# Patient Record
Sex: MALE | Race: WHITE | NOT HISPANIC OR LATINO
[De-identification: names, ages, dates, MRNs, and addresses within clinical notes are randomized per-mention and may not be internally consistent; named-entity substitution may affect disease eponyms.]

---

## 2017-02-17 ENCOUNTER — CHARTING TRANS (OUTPATIENT)
Dept: OTHER | Age: 27
End: 2017-02-17

## 2017-02-27 ENCOUNTER — CHARTING TRANS (OUTPATIENT)
Dept: OTHER | Age: 27
End: 2017-02-27

## 2017-03-10 ENCOUNTER — LAB SERVICES (OUTPATIENT)
Dept: OTHER | Age: 27
End: 2017-03-10

## 2017-03-12 ENCOUNTER — CHARTING TRANS (OUTPATIENT)
Dept: OTHER | Age: 27
End: 2017-03-12

## 2017-03-12 LAB
ALBUMIN SERPL-MCNC: 4 G/DL (ref 3.6–5.1)
ALBUMIN/GLOB SERPL: 1.2 (ref 1–2.4)
ALP SERPL-CCNC: 51 UNITS/L (ref 45–117)
ALT SERPL-CCNC: 22 UNITS/L
ANION GAP SERPL CALC-SCNC: 15 MMOL/L (ref 10–20)
AST SERPL-CCNC: 9 UNITS/L
BASOPHILS # BLD: 0 K/MCL (ref 0–0.3)
BASOPHILS NFR BLD: 1 %
BILIRUB SERPL-MCNC: 0.9 MG/DL (ref 0.2–1)
BUN SERPL-MCNC: 13 MG/DL (ref 6–20)
BUN/CREAT SERPL: 20 (ref 7–25)
C PEPTIDE SERPL-MCNC: <0.1 NG/ML (ref 0.8–3.9)
CALCIUM SERPL-MCNC: 9.7 MG/DL (ref 8.4–10.2)
CHLORIDE SERPL-SCNC: 104 MMOL/L (ref 98–107)
CHOLEST SERPL-MCNC: 229 MG/DL
CHOLEST/HDLC SERPL: 5.1
CO2 SERPL-SCNC: 26 MMOL/L (ref 21–32)
CREAT SERPL-MCNC: 0.66 MG/DL (ref 0.67–1.17)
CREATININE RANDOM URINE: 123 MG/DL
DIFFERENTIAL METHOD BLD: ABNORMAL
EOSINOPHIL # BLD: 0.1 K/MCL (ref 0.1–0.5)
EOSINOPHIL NFR BLD: 2 %
ERYTHROCYTE [DISTWIDTH] IN BLOOD: 12.1 % (ref 11–15)
GLOBULIN SER-MCNC: 3.2 G/DL (ref 2–4)
GLUCOSE SERPL-MCNC: 275 MG/DL (ref 65–99)
HBA1C MFR BLD: 8.5 % (ref 4.5–5.6)
HDLC SERPL-MCNC: 45 MG/DL
HEMATOCRIT: 47.4 % (ref 39–51)
HEMOGLOBIN: 16.3 G/DL (ref 13–17)
LDLC SERPL CALC-MCNC: 159 MG/DL
LENGTH OF FAST TIME PATIENT: ABNORMAL HRS
LENGTH OF FAST TIME PATIENT: ABNORMAL HRS
LYMPHOCYTES # BLD: 1.7 K/MCL (ref 1–4.8)
LYMPHOCYTES NFR BLD: 31 %
MEAN CORPUSCULAR HEMOGLOBIN: 32.7 PG (ref 26–34)
MEAN CORPUSCULAR HGB CONC: 34.4 G/DL (ref 32–36.5)
MEAN CORPUSCULAR VOLUME: 95 FL (ref 78–100)
MICROALBUMIN UR-MCNC: 0.82 MG/DL
MICROALBUMIN/CREAT UR: 6.7 MCG/MG
MONOCYTES # BLD: 0.6 K/MCL (ref 0.3–0.9)
MONOCYTES NFR BLD: 10 %
NEUTROPHILS # BLD: 3.1 K/MCL (ref 1.8–7.7)
NEUTROPHILS NFR BLD: 56 %
NONHDLC SERPL-MCNC: 184 MG/DL
PLATELET COUNT: 309 K/MCL (ref 140–450)
POTASSIUM SERPL-SCNC: 4.8 MMOL/L (ref 3.4–5.1)
RED CELL COUNT: 4.99 MIL/MCL (ref 4.5–5.9)
SODIUM SERPL-SCNC: 140 MMOL/L (ref 135–145)
TOTAL PROTEIN: 7.2 G/DL (ref 6.4–8.2)
TRIGL SERPL-MCNC: 124 MG/DL
TSH SERPL-ACNC: 0.95 MCUNITS/ML (ref 0.35–5)
WHITE BLOOD COUNT: 5.5 K/MCL (ref 4.2–11)

## 2017-03-26 ENCOUNTER — CHARTING TRANS (OUTPATIENT)
Dept: OTHER | Age: 27
End: 2017-03-26

## 2017-03-26 LAB — GAD65 AB SER-ACNC: 18.8 IU/ML

## 2018-11-05 VITALS
RESPIRATION RATE: 16 BRPM | HEART RATE: 84 BPM | HEIGHT: 67 IN | OXYGEN SATURATION: 98 % | DIASTOLIC BLOOD PRESSURE: 80 MMHG | SYSTOLIC BLOOD PRESSURE: 132 MMHG | WEIGHT: 149 LBS | TEMPERATURE: 97.5 F | BODY MASS INDEX: 23.39 KG/M2

## 2018-11-05 VITALS
RESPIRATION RATE: 15 BRPM | TEMPERATURE: 98.2 F | SYSTOLIC BLOOD PRESSURE: 120 MMHG | WEIGHT: 154 LBS | HEART RATE: 91 BPM | BODY MASS INDEX: 24.17 KG/M2 | HEIGHT: 67 IN | OXYGEN SATURATION: 97 % | DIASTOLIC BLOOD PRESSURE: 80 MMHG

## 2019-03-18 ENCOUNTER — WALK IN (OUTPATIENT)
Dept: URGENT CARE | Age: 29
End: 2019-03-18

## 2019-03-18 ENCOUNTER — TELEPHONE (OUTPATIENT)
Dept: SCHEDULING | Age: 29
End: 2019-03-18

## 2019-03-18 VITALS
TEMPERATURE: 98.2 F | WEIGHT: 165 LBS | HEIGHT: 67 IN | DIASTOLIC BLOOD PRESSURE: 60 MMHG | BODY MASS INDEX: 25.9 KG/M2 | RESPIRATION RATE: 18 BRPM | OXYGEN SATURATION: 100 % | SYSTOLIC BLOOD PRESSURE: 120 MMHG | HEART RATE: 98 BPM

## 2019-03-18 DIAGNOSIS — H65.192 OTHER ACUTE NONSUPPURATIVE OTITIS MEDIA OF LEFT EAR, RECURRENCE NOT SPECIFIED: ICD-10-CM

## 2019-03-18 DIAGNOSIS — H61.21 IMPACTED CERUMEN OF RIGHT EAR: Primary | ICD-10-CM

## 2019-03-18 PROCEDURE — 69209 REMOVE IMPACTED EAR WAX UNI: CPT | Performed by: NURSE PRACTITIONER

## 2019-03-18 PROCEDURE — 99203 OFFICE O/P NEW LOW 30 MIN: CPT | Performed by: NURSE PRACTITIONER

## 2019-03-18 RX ORDER — INSULIN ASPART 100 [IU]/ML
INJECTION, SOLUTION INTRAVENOUS; SUBCUTANEOUS
COMMUNITY

## 2019-03-18 RX ORDER — AMOXICILLIN 875 MG/1
875 TABLET, COATED ORAL 2 TIMES DAILY
Qty: 20 TABLET | Refills: 0 | Status: SHIPPED | OUTPATIENT
Start: 2019-03-18

## 2019-03-18 RX ORDER — FLUTICASONE PROPIONATE 50 MCG
2 SPRAY, SUSPENSION (ML) NASAL DAILY
Qty: 16 G | Refills: 12 | Status: SHIPPED | OUTPATIENT
Start: 2019-03-18

## 2019-03-18 ASSESSMENT — ENCOUNTER SYMPTOMS
FATIGUE: 0
CHILLS: 0
FEVER: 0
COUGH: 0
UNEXPECTED WEIGHT CHANGE: 0
HEADACHES: 0
ADENOPATHY: 1
DIAPHORESIS: 0
APPETITE CHANGE: 0
ACTIVITY CHANGE: 0

## 2022-07-05 ENCOUNTER — WALK IN (OUTPATIENT)
Dept: URGENT CARE | Age: 32
End: 2022-07-05

## 2022-07-05 VITALS
BODY MASS INDEX: 24.72 KG/M2 | OXYGEN SATURATION: 97 % | TEMPERATURE: 99.3 F | SYSTOLIC BLOOD PRESSURE: 120 MMHG | HEIGHT: 67 IN | WEIGHT: 157.52 LBS | HEART RATE: 109 BPM | DIASTOLIC BLOOD PRESSURE: 86 MMHG | RESPIRATION RATE: 16 BRPM

## 2022-07-05 DIAGNOSIS — L23.7 POISON IVY: Primary | ICD-10-CM

## 2022-07-05 PROCEDURE — 99203 OFFICE O/P NEW LOW 30 MIN: CPT | Performed by: NURSE PRACTITIONER

## 2022-07-05 RX ORDER — CLOBETASOL PROPIONATE 0.5 MG/G
CREAM TOPICAL 2 TIMES DAILY
Qty: 30 G | Refills: 0 | Status: SHIPPED | OUTPATIENT
Start: 2022-07-05

## 2022-07-05 RX ORDER — PREDNISONE 20 MG/1
TABLET ORAL
Qty: 25 TABLET | Refills: 0 | Status: SHIPPED | OUTPATIENT
Start: 2022-07-05

## 2022-07-05 ASSESSMENT — ENCOUNTER SYMPTOMS
ABDOMINAL PAIN: 0
DIARRHEA: 0
ENDOCRINE NEGATIVE: 1
SINUS PAIN: 0
DIZZINESS: 0
NAUSEA: 0
CHILLS: 0
ALLERGIC/IMMUNOLOGIC NEGATIVE: 1
CONSTIPATION: 0
SHORTNESS OF BREATH: 0
SINUS PRESSURE: 0
RHINORRHEA: 0
CHEST TIGHTNESS: 0
COUGH: 0
LIGHT-HEADEDNESS: 0
FEVER: 0
GASTROINTESTINAL NEGATIVE: 1
APPETITE CHANGE: 0
SORE THROAT: 0
RESPIRATORY NEGATIVE: 1
ANOREXIA: 0
EYE PAIN: 0
VOMITING: 0
HEADACHES: 0
WHEEZING: 0
EYES NEGATIVE: 1
WEAKNESS: 0
CONSTITUTIONAL NEGATIVE: 1
PSYCHIATRIC NEGATIVE: 1
NAIL CHANGES: 0
HEMATOLOGIC/LYMPHATIC NEGATIVE: 1
FATIGUE: 0
SEIZURES: 0

## 2023-08-07 ENCOUNTER — HOSPITAL ENCOUNTER (OUTPATIENT)
Age: 33
Discharge: HOME | End: 2023-08-07
Payer: COMMERCIAL

## 2023-08-07 ENCOUNTER — APPOINTMENT (OUTPATIENT)
Dept: GENERAL RADIOLOGY | Age: 33
End: 2023-08-07
Attending: PHYSICIAN ASSISTANT
Payer: COMMERCIAL

## 2023-08-07 VITALS
OXYGEN SATURATION: 98 % | HEART RATE: 82 BPM | RESPIRATION RATE: 18 BRPM | SYSTOLIC BLOOD PRESSURE: 122 MMHG | TEMPERATURE: 98 F | DIASTOLIC BLOOD PRESSURE: 78 MMHG

## 2023-08-07 DIAGNOSIS — J40 BRONCHITIS: Primary | ICD-10-CM

## 2023-08-07 PROCEDURE — 99203 OFFICE O/P NEW LOW 30 MIN: CPT

## 2023-08-07 PROCEDURE — 71046 X-RAY EXAM CHEST 2 VIEWS: CPT | Performed by: PHYSICIAN ASSISTANT

## 2023-08-07 RX ORDER — AZITHROMYCIN 250 MG/1
TABLET, FILM COATED ORAL
Qty: 6 TABLET | Refills: 0 | Status: SHIPPED | OUTPATIENT
Start: 2023-08-07 | End: 2023-08-12

## 2023-08-07 RX ORDER — ALBUTEROL SULFATE 90 UG/1
2 AEROSOL, METERED RESPIRATORY (INHALATION) EVERY 4 HOURS PRN
Qty: 1 EACH | Refills: 0 | Status: SHIPPED | OUTPATIENT
Start: 2023-08-07 | End: 2023-09-06

## 2023-08-07 RX ORDER — INSULIN ASPART 100 [IU]/ML
INJECTION, SOLUTION INTRAVENOUS; SUBCUTANEOUS
COMMUNITY
Start: 2022-10-14

## 2023-08-07 RX ORDER — INSULIN LISPRO 100 [IU]/ML
INJECTION, SOLUTION INTRAVENOUS; SUBCUTANEOUS
COMMUNITY
Start: 2023-07-13

## 2023-08-07 RX ORDER — GUAIFENESIN AND DEXTROMETHORPHAN HYDROBROMIDE 1200; 60 MG/1; MG/1
1 TABLET, EXTENDED RELEASE ORAL EVERY 12 HOURS
Qty: 14 TABLET | Refills: 0 | Status: SHIPPED | OUTPATIENT
Start: 2023-08-07 | End: 2023-08-07 | Stop reason: ALTCHOICE

## 2023-08-07 NOTE — ED INITIAL ASSESSMENT (HPI)
Pt c/o cough x 1 month, denies SOB, no fever, states fever mainly dry but more congested and worsening in the last 3 days.

## 2023-10-07 ENCOUNTER — APPOINTMENT (OUTPATIENT)
Dept: GENERAL RADIOLOGY | Age: 33
End: 2023-10-07
Attending: EMERGENCY MEDICINE

## 2023-10-07 ENCOUNTER — HOSPITAL ENCOUNTER (OUTPATIENT)
Age: 33
Discharge: HOME | End: 2023-10-07
Attending: EMERGENCY MEDICINE

## 2023-10-07 VITALS
HEART RATE: 92 BPM | OXYGEN SATURATION: 98 % | RESPIRATION RATE: 20 BRPM | TEMPERATURE: 98 F | DIASTOLIC BLOOD PRESSURE: 85 MMHG | SYSTOLIC BLOOD PRESSURE: 118 MMHG

## 2023-10-07 DIAGNOSIS — S22.060A CLOSED WEDGE COMPRESSION FRACTURE OF T8 VERTEBRA, INITIAL ENCOUNTER (HCC): Primary | ICD-10-CM

## 2023-10-07 PROCEDURE — 72072 X-RAY EXAM THORAC SPINE 3VWS: CPT | Performed by: EMERGENCY MEDICINE

## 2023-10-07 PROCEDURE — 99214 OFFICE O/P EST MOD 30 MIN: CPT

## 2023-10-07 PROCEDURE — 99213 OFFICE O/P EST LOW 20 MIN: CPT

## 2023-10-07 RX ORDER — TRAMADOL HYDROCHLORIDE 50 MG/1
TABLET ORAL EVERY 6 HOURS PRN
Qty: 20 TABLET | Refills: 0 | Status: SHIPPED | OUTPATIENT
Start: 2023-10-07 | End: 2023-10-16

## 2023-10-07 NOTE — DISCHARGE INSTRUCTIONS
Over the counter ibuprofen 600mg every six hours, lidocaine patch as directed  Tramadol as prescribed. Follow up with dr Bourne Sessions for further treatment.

## 2023-10-16 ENCOUNTER — OFFICE VISIT (OUTPATIENT)
Dept: INTERNAL MEDICINE CLINIC | Facility: CLINIC | Age: 33
End: 2023-10-16

## 2023-10-16 ENCOUNTER — TELEPHONE (OUTPATIENT)
Dept: INTERNAL MEDICINE CLINIC | Facility: CLINIC | Age: 33
End: 2023-10-16

## 2023-10-16 VITALS
DIASTOLIC BLOOD PRESSURE: 87 MMHG | HEIGHT: 67 IN | BODY MASS INDEX: 23.07 KG/M2 | WEIGHT: 147 LBS | HEART RATE: 103 BPM | SYSTOLIC BLOOD PRESSURE: 129 MMHG

## 2023-10-16 DIAGNOSIS — F41.1 GAD (GENERALIZED ANXIETY DISORDER): ICD-10-CM

## 2023-10-16 DIAGNOSIS — F90.9 ATTENTION DEFICIT HYPERACTIVITY DISORDER (ADHD), UNSPECIFIED ADHD TYPE: ICD-10-CM

## 2023-10-16 DIAGNOSIS — Z00.00 WELLNESS EXAMINATION: Primary | ICD-10-CM

## 2023-10-16 DIAGNOSIS — E10.9 TYPE 1 DIABETES MELLITUS WITHOUT COMPLICATION (HCC): ICD-10-CM

## 2023-10-16 PROCEDURE — 3008F BODY MASS INDEX DOCD: CPT | Performed by: NURSE PRACTITIONER

## 2023-10-16 PROCEDURE — 3079F DIAST BP 80-89 MM HG: CPT | Performed by: NURSE PRACTITIONER

## 2023-10-16 PROCEDURE — 99203 OFFICE O/P NEW LOW 30 MIN: CPT | Performed by: NURSE PRACTITIONER

## 2023-10-16 PROCEDURE — 99385 PREV VISIT NEW AGE 18-39: CPT | Performed by: NURSE PRACTITIONER

## 2023-10-16 PROCEDURE — 3074F SYST BP LT 130 MM HG: CPT | Performed by: NURSE PRACTITIONER

## 2023-10-16 RX ORDER — LISDEXAMFETAMINE DIMESYLATE CAPSULES 30 MG/1
CAPSULE ORAL
COMMUNITY
Start: 2023-09-25

## 2023-10-16 RX ORDER — INSULIN LISPRO 100 [IU]/ML
INJECTION, SOLUTION INTRAVENOUS; SUBCUTANEOUS
Qty: 10 ML | Refills: 2 | Status: SHIPPED | OUTPATIENT
Start: 2023-10-16

## 2023-10-16 NOTE — TELEPHONE ENCOUNTER
[facility] is requesting clarification on medication Insulin. .    Needs directions posted on prescription    Fax # [phone]

## 2023-10-16 NOTE — TELEPHONE ENCOUNTER
Patient contacted. He has a [employer]ron[employer] Mind 670G insulin pump and automatically adjusts based on CGM device. He states generally he uses 200ml into his pump every 3 days     Attempted to call [facility] with updated information. Pharmacy not available. Call tomorrow.    (He just established care with our office.)

## 2023-10-16 NOTE — TELEPHONE ENCOUNTER
Spoke to the Earnstine Breeze who asked if Insulin pump dose is correct at 35.85 units. She's asking for clarification on how much the basal rate should be hourly. She is figuring 35.85 over 24 hours plus 15 units bolus is 45 so should the daily dose be 35.85 plus 45 so total of 80.85? Can you please clarify the directions as the pharmacy themselves are a bit confused.  thanks

## 2023-10-17 NOTE — TELEPHONE ENCOUNTER
Called [facility] pharmacist Jacinta Mabry indicated that if patient is using 200ml every 3 days then that is 20 vials. Pharmacist indicated that it might be 200units not ml. Conferenced call with patient and pharmacist. Patient confirmed he is using 200 units every 3 days, so 2 vials. Pharmacist will dispense quantity sufficient with 2 refills with a 81 unit max per day. Rx updated in notes.

## 2023-11-20 ENCOUNTER — LAB ENCOUNTER (OUTPATIENT)
Dept: LAB | Age: 33
End: 2023-11-20
Attending: NURSE PRACTITIONER
Payer: COMMERCIAL

## 2023-11-20 DIAGNOSIS — F41.1 GAD (GENERALIZED ANXIETY DISORDER): ICD-10-CM

## 2023-11-20 DIAGNOSIS — E10.9 TYPE 1 DIABETES MELLITUS WITHOUT COMPLICATION (HCC): ICD-10-CM

## 2023-11-20 LAB
ALBUMIN SERPL-MCNC: 4.9 G/DL (ref 3.2–4.8)
ALBUMIN/GLOB SERPL: 1.6 {RATIO} (ref 1–2)
ALP LIVER SERPL-CCNC: 62 U/L
ALT SERPL-CCNC: 16 U/L
ANION GAP SERPL CALC-SCNC: 6 MMOL/L (ref 0–18)
AST SERPL-CCNC: 17 U/L (ref ?–34)
BASOPHILS # BLD AUTO: 0.13 X10(3) UL (ref 0–0.2)
BASOPHILS NFR BLD AUTO: 1.1 %
BILIRUB SERPL-MCNC: 0.6 MG/DL (ref 0.3–1.2)
BUN BLD-MCNC: 9 MG/DL (ref 9–23)
BUN/CREAT SERPL: 8.9 (ref 10–20)
CALCIUM BLD-MCNC: 10.2 MG/DL (ref 8.7–10.4)
CHLORIDE SERPL-SCNC: 99 MMOL/L (ref 98–112)
CHOLEST SERPL-MCNC: 182 MG/DL (ref ?–200)
CO2 SERPL-SCNC: 27 MMOL/L (ref 21–32)
CREAT BLD-MCNC: 1.01 MG/DL
CREAT UR-SCNC: 50.2 MG/DL
DEPRECATED RDW RBC AUTO: 39.9 FL (ref 35.1–46.3)
EGFRCR SERPLBLD CKD-EPI 2021: 101 ML/MIN/1.73M2 (ref 60–?)
EOSINOPHIL # BLD AUTO: 0.26 X10(3) UL (ref 0–0.7)
EOSINOPHIL NFR BLD AUTO: 2.2 %
ERYTHROCYTE [DISTWIDTH] IN BLOOD BY AUTOMATED COUNT: 12.1 % (ref 11–15)
EST. AVERAGE GLUCOSE BLD GHB EST-MCNC: 229 MG/DL (ref 68–126)
FASTING PATIENT LIPID ANSWER: NO
FASTING STATUS PATIENT QL REPORTED: NO
GLOBULIN PLAS-MCNC: 3 G/DL (ref 2.8–4.4)
GLUCOSE BLD-MCNC: 341 MG/DL (ref 70–99)
HBA1C MFR BLD: 9.6 % (ref ?–5.7)
HCT VFR BLD AUTO: 46 %
HDLC SERPL-MCNC: 38 MG/DL (ref 40–59)
HGB BLD-MCNC: 16.1 G/DL
IMM GRANULOCYTES # BLD AUTO: 0.02 X10(3) UL (ref 0–1)
IMM GRANULOCYTES NFR BLD: 0.2 %
LDLC SERPL CALC-MCNC: 119 MG/DL (ref ?–100)
LYMPHOCYTES # BLD AUTO: 1.82 X10(3) UL (ref 1–4)
LYMPHOCYTES NFR BLD AUTO: 15.1 %
MCH RBC QN AUTO: 31.1 PG (ref 26–34)
MCHC RBC AUTO-ENTMCNC: 35 G/DL (ref 31–37)
MCV RBC AUTO: 89 FL
MICROALBUMIN UR-MCNC: <0.3 MG/DL
MONOCYTES # BLD AUTO: 1.23 X10(3) UL (ref 0.1–1)
MONOCYTES NFR BLD AUTO: 10.2 %
NEUTROPHILS # BLD AUTO: 8.58 X10 (3) UL (ref 1.5–7.7)
NEUTROPHILS # BLD AUTO: 8.58 X10(3) UL (ref 1.5–7.7)
NEUTROPHILS NFR BLD AUTO: 71.2 %
NONHDLC SERPL-MCNC: 144 MG/DL (ref ?–130)
OSMOLALITY SERPL CALC.SUM OF ELEC: 286 MOSM/KG (ref 275–295)
PLATELET # BLD AUTO: 401 10(3)UL (ref 150–450)
POTASSIUM SERPL-SCNC: 4.7 MMOL/L (ref 3.5–5.1)
PROT SERPL-MCNC: 7.9 G/DL (ref 5.7–8.2)
RBC # BLD AUTO: 5.17 X10(6)UL
SODIUM SERPL-SCNC: 132 MMOL/L (ref 136–145)
TRIGL SERPL-MCNC: 137 MG/DL (ref 30–149)
TSI SER-ACNC: 1.23 MIU/ML (ref 0.55–4.78)
VLDLC SERPL CALC-MCNC: 24 MG/DL (ref 0–30)
WBC # BLD AUTO: 12 X10(3) UL (ref 4–11)

## 2023-11-20 PROCEDURE — 84443 ASSAY THYROID STIM HORMONE: CPT

## 2023-11-20 PROCEDURE — 85025 COMPLETE CBC W/AUTO DIFF WBC: CPT

## 2023-11-20 PROCEDURE — 82570 ASSAY OF URINE CREATININE: CPT

## 2023-11-20 PROCEDURE — 36415 COLL VENOUS BLD VENIPUNCTURE: CPT

## 2023-11-20 PROCEDURE — 83036 HEMOGLOBIN GLYCOSYLATED A1C: CPT

## 2023-11-20 PROCEDURE — 80053 COMPREHEN METABOLIC PANEL: CPT

## 2023-11-20 PROCEDURE — 80061 LIPID PANEL: CPT

## 2023-11-20 PROCEDURE — 82043 UR ALBUMIN QUANTITATIVE: CPT

## 2023-11-25 ENCOUNTER — PATIENT MESSAGE (OUTPATIENT)
Dept: INTERNAL MEDICINE CLINIC | Facility: CLINIC | Age: 33
End: 2023-11-25

## 2023-11-27 NOTE — TELEPHONE ENCOUNTER
Pt can contact his insurance for in network provider. If he is unable to get a sooner appt with Sharon Randhawa, he can try Dr Naun Smith or Dr Kayla Chanel.

## 2023-12-01 ENCOUNTER — OFFICE VISIT (OUTPATIENT)
Dept: ENDOCRINOLOGY CLINIC | Facility: CLINIC | Age: 33
End: 2023-12-01
Payer: COMMERCIAL

## 2023-12-01 VITALS
SYSTOLIC BLOOD PRESSURE: 128 MMHG | DIASTOLIC BLOOD PRESSURE: 88 MMHG | HEART RATE: 121 BPM | WEIGHT: 137 LBS | BODY MASS INDEX: 21 KG/M2

## 2023-12-01 DIAGNOSIS — R79.89 LOW VITAMIN D LEVEL: ICD-10-CM

## 2023-12-01 DIAGNOSIS — L65.9 HAIR LOSS: Primary | ICD-10-CM

## 2023-12-01 DIAGNOSIS — E10.649 UNCONTROLLED TYPE 1 DIABETES MELLITUS WITH HYPOGLYCEMIA WITHOUT COMA (HCC): ICD-10-CM

## 2023-12-01 DIAGNOSIS — E10.65 UNCONTROLLED TYPE 1 DIABETES MELLITUS WITH HYPERGLYCEMIA (HCC): ICD-10-CM

## 2023-12-01 LAB
GLUCOSE BLOOD: 86
TEST STRIP EXPIRATION DATE: NORMAL DATE
TEST STRIP LOT #: NORMAL NUMERIC

## 2023-12-01 PROCEDURE — 82947 ASSAY GLUCOSE BLOOD QUANT: CPT | Performed by: INTERNAL MEDICINE

## 2023-12-01 PROCEDURE — 3079F DIAST BP 80-89 MM HG: CPT | Performed by: INTERNAL MEDICINE

## 2023-12-01 PROCEDURE — 99205 OFFICE O/P NEW HI 60 MIN: CPT | Performed by: INTERNAL MEDICINE

## 2023-12-01 PROCEDURE — 3074F SYST BP LT 130 MM HG: CPT | Performed by: INTERNAL MEDICINE

## 2023-12-01 RX ORDER — INSULIN GLARGINE 100 [IU]/ML
40 INJECTION, SOLUTION SUBCUTANEOUS NIGHTLY
Qty: 15 ML | Refills: 0 | Status: SHIPPED | OUTPATIENT
Start: 2023-12-01 | End: 2024-02-29

## 2023-12-01 RX ORDER — BLOOD SUGAR DIAGNOSTIC
1 STRIP MISCELLANEOUS 3 TIMES DAILY
Qty: 100 STRIP | Refills: 11 | Status: SHIPPED | OUTPATIENT
Start: 2023-12-01 | End: 2024-11-25

## 2023-12-01 RX ORDER — PROCHLORPERAZINE 25 MG/1
1 SUPPOSITORY RECTAL
Qty: 1 EACH | Refills: 1 | Status: SHIPPED | OUTPATIENT
Start: 2023-12-01 | End: 2024-05-29

## 2023-12-01 RX ORDER — INSULIN ASPART INJECTION 100 [IU]/ML
100 INJECTION, SOLUTION SUBCUTANEOUS DAILY
Qty: 20 ML | Refills: 11 | Status: SHIPPED | OUTPATIENT
Start: 2023-12-01 | End: 2024-11-25

## 2023-12-01 RX ORDER — PROCHLORPERAZINE 25 MG/1
1 SUPPOSITORY RECTAL
Qty: 3 EACH | Refills: 11 | Status: SHIPPED | OUTPATIENT
Start: 2023-12-01

## 2023-12-01 NOTE — PROGRESS NOTES
New Patient Evaluation - History and Physical    CONSULT - Reason for Visit:  t1DM. Requesting Physician: Lowell Allison MD    CHIEF COMPLAINT:  t1DM on pump     HISTORY OF PRESENT ILLNESS:   Abiodun Ramos is a 34year old male who presents with t1DM    Was seeing Endo at  Dr Robel Mosher    t1DM dx when 12   DKA x2. Last in   Has been on pump since . Omnipod, medtronic and now on pump 770g ~ 2018    He wants to switch to 780G but open to try new ones. He contacted medtronic already  Needs a new endocrinologist  Does not like the alarms from [address]. Does not go on pump holiday   Does not have lantus  , glucacon   Has ketone strips   Rarely overrides- Increases typical by 1.5 unit  Correction 2 units for every 50 over 150     Bolus   ICR 6.3           insulin carb ratio   ISF 32             insulin sensitivity factor   AIT 4.3             active insulin time   Target     Basal 36.975 unit/day  MN 1.3  3AM  1.4  7AM 1.7  330PM 1.55        A1c 9.6, tsh, lipid, CMp  Neg alb/creat      SSx   Works for [employer] for work      3 yo daughter   Sandrita Mcarthur   No etoh now - 8 mo stopped   No marijuana   No drugs       T8 Vertebrae fracture - saw spine specialist for that. 2024   Takes vit D and Ca daily now         DM quality measures:  A1C/Blood pressure: as reported above   Nephropathy screenin2023 neg LIPID screenin2023  Last dilated eye exam: 2022  data recorded  Last diabetic foot exam: 2023  Dentist : recommend every 6m      ASSESSMENT AND PLAN:      Uncontrolled t1DM on pump  Hypo and hyperglycemia   Not using CGM d/t errors and alerts from [employer] so will switch to [id number]   Due for a new pump   Discussed w/ pt in length and he is motivated.  He will research which pump and let us know   Made pump changes today   UTD on foot exam and needs eye exam now     plan  Labs and follow up in 3 mo   Will send [address] G6 orders  - will change to [facility] once it is integrated with the pumps    Call us when you decide which pump (tandem vs omnipod) to send new orders    Dec will get eye exam   12/1/2023 Foot exam     New pump settings   Bolus   ICR 6.3             insulin carb ratio   ISF 32  ->29       insulin sensitivity factor   AIT 4.3  ->4         active insulin time   Target     Basal 36.975 unit/day  MN 1.3  3AM  1.4  7AM 1.7  330PM 1.55      PAST MEDICAL HISTORY:   T1dm  T8 Vertebrae fracture - saw spine specialist for that. 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 15 mL 0    Urine Glucose-Ketones Test In Vitro Strip Use if BG > 250 50 strip 0    Insulin Aspart, w/Niacinamide, (FIASP) 100 UNIT/ML Injection Solution 100 Units by Other route daily. 20 mL 11    Glucose Blood (BLOOD GLUCOSE TEST STRIPS 333) In Vitro Strip 1 each by In Vitro route 3 (three) times daily. 100 strip 11    Continuous Blood Gluc Sensor (DEXCOM G6 SENSOR) Does not apply Misc 1 each Every 10 days. Use as directed every 10 days 3 each 11    lisdexamfetamine 30 MG Oral Cap       HUMALOG 100 UNIT/ML Injection Solution Insulin pump: 35.85 units basal insulin daily. 10-15 units bolus with each meal. 10 mL 2    insulin aspart 100 Units/mL Injection Solution INJECT UP TO 70 UNITS UNDER THE SKIN DAILY VIA MEDTRONIC 670G INSULIN PUMP.  OK to change to Humalog if Novolog not covered      sertraline 50 MG Oral Tab          ALLERGIES:  No Known Allergies    SOCIAL HISTORY:    Social History     Socioeconomic History    Marital status:    Tobacco Use    Smoking status: Never    Smokeless tobacco: Never       FAMILY HISTORY:     T1dm in PGF  Cousin with t1DM had HD and passed a way from complications   F prostate ca in remission now  P uncle cancer ? MGF lung ca  M osteoporosis, recovery from etoh, adult ADD  Brother is in recovery from etoh    REVIEW OF SYSTEMS:  All negative other than HPI      PHYSICAL EXAM:   Height: --  Weight: 137 lb (62.1 kg) (12/01 0728)  BSA (Calculated - sq m): --  Pulse: 121 (12/01 0728)  BP: 128/88 (12/01 0728)  Temp: --  Do Not Use - Resp Rate: --  SpO2: --      No goiter  No tremors  No lipohypertrophy   Pump site is on Rt back   Foot exam is normal - no ulcers, good pulse, good hair, normal monofilament test    CONSTITUTIONAL:  Awake and alert. Age appropriate, good hygiene not in acute distress. Well nourished and well developed. no acute distress   PSYCH:   Orientated to time, place, person & situation, Normal mood and affect, memory intact, normal insight and judgment, cooperative  Neuro: speech is clear. Awake, alert, no aphasia, no facial asymmetry, no nuchal rigidity  EYES:  No proptosis, no ptosis, conjunctiva normal  ENT:  Normocephalic, atraumatic  Eye: EOMI, normal lids, no discharge, no conjunctival erythema. No exophthalmos/proptosis, Ptosis negative   No rhinorrhea, moist oral mucosa  Neck: full range of motion  Neck/Thyroid: neck inspection: normal, No scar, No goiter   LUNGS:  No acute respiratory distress, non-labored respiration. Speaking full sentences  CARDIOVASCULAR:  regular rate   ABDOMEN:  No abdominal pain. Nontender soft   SKIN:  no bruising or bleeding, no rashes and no lesions, Skin is dry, no obvious rashes or lesions  EXTREMITIES: no gross abnormality   MSK: Moves extremities spontaneously.  full range of motion in all major joints      DATA:     Pertinent data reviewed    Orders Placed This Encounter   Procedures    POC HemoCue Glucose 201 (Finger stick glucose)    Hemoglobin A1C [E]       12/1/2023  Chery Chairez MD

## 2023-12-01 NOTE — PATIENT INSTRUCTIONS
Labs and follow up in 3 mo   Will send Aryan Rm orders  - will change to [address] [facility] once it is integrated with the pumps    Call us when you decide which pump (tandem vs omnipod) to send new orders    Dec will get eye exam   12/1/2023 Foot exam     New pump settings   Bolus   ICR 6.3             insulin carb ratio   ISF 32  ->29       insulin sensitivity factor   AIT 4.3  ->4         active insulin time   Target     Basal 36.975 unit/day  MN 1.3  3AM  1.4  7AM 1.7  330PM 1.55

## 2023-12-02 ENCOUNTER — PATIENT MESSAGE (OUTPATIENT)
Dept: ENDOCRINOLOGY CLINIC | Facility: CLINIC | Age: 33
End: 2023-12-02

## 2023-12-04 ENCOUNTER — TELEPHONE (OUTPATIENT)
Dept: ENDOCRINOLOGY CLINIC | Facility: CLINIC | Age: 33
End: 2023-12-04

## 2023-12-04 NOTE — TELEPHONE ENCOUNTER
Dr. Eric Thomas     Please advise on message.  We typically give patients our Tandem rep information Stevenson Scale for patient to follow up

## 2023-12-06 ENCOUNTER — MED REC SCAN ONLY (OUTPATIENT)
Dept: ENDOCRINOLOGY CLINIC | Facility: CLINIC | Age: 33
End: 2023-12-06

## 2023-12-14 ENCOUNTER — TELEPHONE (OUTPATIENT)
Dept: ENDOCRINOLOGY CLINIC | Facility: CLINIC | Age: 33
End: 2023-12-14

## 2023-12-14 NOTE — TELEPHONE ENCOUNTER
Clint Arechiga calling to inform RN that Dexcom was denied. For additional questions please call with ref# [id number]. Thank you.

## 2023-12-26 NOTE — TELEPHONE ENCOUNTER
Pharmacy requesting refill of     insulin glargine (LANTUS SOLOSTAR) 100 UNIT/ML Subcutaneous Solution Pen-injector, Inject 40 Units into the skin nightly., Disp: 15 mL, Rfl: 0

## 2023-12-27 RX ORDER — INSULIN GLARGINE 100 [IU]/ML
40 INJECTION, SOLUTION SUBCUTANEOUS NIGHTLY
Qty: 15 ML | Refills: 0 | Status: SHIPPED | OUTPATIENT
Start: 2023-12-27 | End: 2024-03-26

## 2023-12-29 ENCOUNTER — TELEPHONE (OUTPATIENT)
Dept: ENDOCRINOLOGY CLINIC | Facility: CLINIC | Age: 33
End: 2023-12-29

## 2023-12-29 NOTE — TELEPHONE ENCOUNTER
Pt states that he received notification that the PA for Dexcom was denied.  He needs to know if this is being appealed.  He has a short time to return pump if he can not get the Dexcom.  Please call

## 2024-01-02 RX ORDER — INSULIN GLARGINE 100 [IU]/ML
40 INJECTION, SOLUTION SUBCUTANEOUS NIGHTLY
Qty: 36 ML | Refills: 0 | Status: SHIPPED | OUTPATIENT
Start: 2024-01-02 | End: 2024-04-01

## 2024-01-02 NOTE — TELEPHONE ENCOUNTER
LOV: 12/1/23    RTC: 3 Months    FU: No FU Appt Scheduled    Last Refill: 12/27/23 but are requesting a 3 month supply to sent    3 Month Supply Pending Below

## 2024-01-04 NOTE — TELEPHONE ENCOUNTER
Received Dexcom denial letter that states G7 sensor is denied due to patient not meeting criteria:    1) You are motivated and knowledgeable about the use of continuous glucose monitoring, are adherent to the diabetic treatment plan, and participate in ongoing education and support.  (2) You have an inadequate glycemic control despite intensive diabetes management.  (3) You regularly monitor your blood glucose four or more times per day.     Patient meets all this criteria so unsure why this was denied. Will move forward with appeal per Dr. Palmer in encounter . Letter generated and faxed with LOV note and A1C result to [facility] Appeals (expedited) at [phone]. MyChart sent to patient.

## 2024-01-15 NOTE — TELEPHONE ENCOUNTER
Approval letter was received by mail for the following device: Dexcom G7 Sensor. Letter was placed in RN's file cabinet.

## 2024-01-24 ENCOUNTER — HOSPITAL ENCOUNTER (OUTPATIENT)
Age: 34
Discharge: HOME | End: 2024-01-24
Attending: EMERGENCY MEDICINE
Payer: COMMERCIAL

## 2024-01-24 VITALS
DIASTOLIC BLOOD PRESSURE: 81 MMHG | HEART RATE: 120 BPM | OXYGEN SATURATION: 100 % | RESPIRATION RATE: 18 BRPM | SYSTOLIC BLOOD PRESSURE: 142 MMHG | TEMPERATURE: 99 F

## 2024-01-24 DIAGNOSIS — B36.9 FUNGAL SKIN INFECTION: Primary | ICD-10-CM

## 2024-01-24 LAB
BILIRUB UR QL STRIP: NEGATIVE
CLARITY UR: CLEAR
COLOR UR: YELLOW
GLUCOSE BLDC GLUCOMTR-MCNC: 114 MG/DL (ref 70–99)
GLUCOSE UR STRIP-MCNC: 100 MG/DL
HGB UR QL STRIP: NEGATIVE
KETONES UR STRIP-MCNC: NEGATIVE MG/DL
LEUKOCYTE ESTERASE UR QL STRIP: NEGATIVE
NITRITE UR QL STRIP: NEGATIVE
PH UR STRIP: 7 [PH]
SP GR UR STRIP: 1.01
UROBILINOGEN UR STRIP-ACNC: 4 MG/DL

## 2024-01-24 PROCEDURE — 99213 OFFICE O/P EST LOW 20 MIN: CPT

## 2024-01-24 PROCEDURE — 82962 GLUCOSE BLOOD TEST: CPT

## 2024-01-24 PROCEDURE — 87591 N.GONORRHOEAE DNA AMP PROB: CPT | Performed by: EMERGENCY MEDICINE

## 2024-01-24 PROCEDURE — 81002 URINALYSIS NONAUTO W/O SCOPE: CPT

## 2024-01-24 PROCEDURE — 87491 CHLMYD TRACH DNA AMP PROBE: CPT | Performed by: EMERGENCY MEDICINE

## 2024-01-24 RX ORDER — CLOTRIMAZOLE 1 %
1 CREAM (GRAM) TOPICAL 2 TIMES DAILY
Qty: 12 G | Refills: 0 | Status: SHIPPED | OUTPATIENT
Start: 2024-01-24

## 2024-01-24 NOTE — ED PROVIDER NOTES
Patient Seen in: [facility] [place]      History     Chief Complaint   Patient presents with    Eval-G     Stated Complaint: Sdi Sreening    Subjective:   HPI    Patient is a 33-year-old male with past history of type 1 diabetes who presents now with redness to the left shaft of the penis.  The patient states he noticed this couple of days ago.  Patient denies any dysuria or urethral discharge.  The patient denies any previous STD.         Objective:   Past Medical History:   Diagnosis Date    Type 1 diabetes mellitus (HCC)               History reviewed. No pertinent surgical history.             Social History     Socioeconomic History    Marital status:    Tobacco Use    Smoking status: Never    Smokeless tobacco: Never   Vaping Use    Vaping Use: Never used   Substance and Sexual Activity    Alcohol use: Not Currently    Drug use: Not Currently              Review of Systems    Positive for stated complaint: Sdi Sreening  Other systems are as noted in HPI.  Constitutional and vital signs reviewed.      All other systems reviewed and negative except as noted above.    Physical Exam     ED Triage Vitals [01/24/24 0850]   /81   Pulse 120   Resp 18   Temp 98.7 °F (37.1 °C)   Temp src Temporal   SpO2 100 %   O2 Device None (Room air)       Current:/81   Pulse 120   Temp 98.7 °F (37.1 °C) (Temporal)   Resp 18   SpO2 100%         Physical Exam    Constitutional: Well-developed well-nourished in no acute distress  Head: Normocephalic, no swelling or tenderness  Eyes: Nonicteric sclera, no conjunctival injection  ENT: TMs are clear and flat bilaterally.  There is no posterior pharyngeal erythema  Chest: Clear to auscultation, no tenderness  Cardiovascular: Regular rate and rhythm without murmur  Abdomen: Soft, nontender and nondistended  : Normal circumcised male.  There is a small 0.5 x 0.5 area of erythema to the left side of the midshaft penis.  There patient is mildly erythematous and  flat.  There is no vesicular lesion.  There is no ulceration.  There are no additional skin lesions  Neurologic: Patient is awake, alert and oriented ×3.  The patient's motor strength is 5 out of 5 and symmetric in the upper and lower extremities bilaterally  Extremities: No focal swelling or tenderness  Skin: No pallor, no redness or warmth to the touch      ED Course     Labs Reviewed   [facility] POCT URINALYSIS DIPSTICK - Abnormal; Notable for the following components:       Result Value    Protein urine Trace (*)     Glucose, Urine 100 (*)     Urobilinogen urine 4.0 (*)     All other components within normal limits   CHLAMYDIA/GONOCOCCUS, GIACOMO             Possibility of mild skin irritation versus fungal etiology secondary to patient's diabetes was discussed.  The patient states he is recently been wearing more tight fitting underwear as well which may be rubbing.  Will initiate clotrimazole cream, provide with urology follow-up.  Will send for GC/chlamydia as well         MDM      Skin irritation versus fungal skin infection                                   Medical Decision Making      Disposition and Plan     Clinical Impression:  1. Fungal skin infection         Disposition:  Discharge  1/24/2024  9:10 am    Follow-up:  Maureen Guerra MD  [address] [place] [address]  [phone]      As needed    Phuc Singh MD  [address] [place] [address]  [phone]      For any persistent skin changes          Medications Prescribed:  Current Discharge Medication List        START taking these medications    Details   clotrimazole 1 % External Cream Apply 1 Application topically 2 (two) times daily.  Qty: 12 g, Refills: 0

## 2024-01-24 NOTE — ED INITIAL ASSESSMENT (HPI)
Penile comfort post sexual intercourse last night, denies penile discharge, here for std testing, states has had feeling of not totally emptying post void

## 2024-01-25 LAB
C TRACH DNA SPEC QL NAA+PROBE: NEGATIVE
N GONORRHOEA DNA SPEC QL NAA+PROBE: NEGATIVE

## 2024-02-07 RX ORDER — INSULIN LISPRO 100 [IU]/ML
INJECTION, SOLUTION INTRAVENOUS; SUBCUTANEOUS
Qty: 20 ML | Refills: 0 | OUTPATIENT
Start: 2024-02-07

## 2024-02-07 RX ORDER — INSULIN LISPRO 100 [IU]/ML
INJECTION, SOLUTION INTRAVENOUS; SUBCUTANEOUS
Qty: 90 ML | Refills: 1 | Status: SHIPPED | OUTPATIENT
Start: 2024-02-07

## 2024-02-07 NOTE — TELEPHONE ENCOUNTER
LOV 12/1/23  [facility] 3 months  No F/U scheduled  Patient on a pump up to 100 units per day.     Rx for fiasp was was recently sent to [facility]. It seems [facility] pharmacy is requesting humalog. Called the pharmacist. Fiasp is not covered by plan.  Humalog preferred. Rx pending.

## 2024-02-07 NOTE — TELEPHONE ENCOUNTER
Pharmacy called for refill:       Current Outpatient Medications:        HUMALOG 100 UNIT/ML Injection Solution, Insulin pump: 35.85 units basal insulin daily. 10-15 units bolus with each meal., Disp: 10 mL, Rfl: 2    Pt is completely out of medication.  Please call.

## 2024-04-03 RX ORDER — ACYCLOVIR 400 MG/1
TABLET ORAL
Qty: 9 EACH | Refills: 1 | Status: SHIPPED | OUTPATIENT
Start: 2024-04-03

## 2024-04-03 NOTE — TELEPHONE ENCOUNTER
Endocrine Refill protocol for CGM supplies       Protocol Criteria:  Appointment with Endocrinology completed in the last 12 months or scheduled in the next 6 months     Verify appointment has been completed or scheduled in the appropriate timeline. If so can send a 90 day supply with 1 refill.        Last completed office visit:12/01/23  Next scheduled Follow up: no f/u      Yes

## 2024-05-02 ENCOUNTER — TELEMEDICINE (OUTPATIENT)
Dept: INTERNAL MEDICINE CLINIC | Facility: CLINIC | Age: 34
End: 2024-05-02
Payer: COMMERCIAL

## 2024-05-02 ENCOUNTER — TELEPHONE (OUTPATIENT)
Dept: INTERNAL MEDICINE CLINIC | Facility: CLINIC | Age: 34
End: 2024-05-02

## 2024-05-02 DIAGNOSIS — Z82.62 FAMILY HISTORY OF OSTEOPOROSIS: Primary | ICD-10-CM

## 2024-05-02 DIAGNOSIS — Z82.62 FAMILY HISTORY OF OSTEOPOROSIS: ICD-10-CM

## 2024-05-02 DIAGNOSIS — Z87.81 HISTORY OF COMPRESSION FRACTURE OF SPINE: Primary | ICD-10-CM

## 2024-05-02 DIAGNOSIS — Z87.81 HISTORY OF COMPRESSION FRACTURE OF SPINE: ICD-10-CM

## 2024-05-02 PROCEDURE — 99213 OFFICE O/P EST LOW 20 MIN: CPT | Performed by: NURSE PRACTITIONER

## 2024-05-02 NOTE — PROGRESS NOTES
Vasiliy Adams is a 33 year old male.    This visit is conducted using Telemedicine with live, interactive video and audio.    Patient has been referred to the [facility] website at [email or web address] to review the yearly Consent to Treat document.    Patient understands and accepts financial responsibility for any deductible, co-insurance and/or co-pays associated with this service.    HPI:   Pt requesting DEXA. Hx of compression fracture and family hx of osteoporosis. Had a visit with spinal specialist one year ago who recommended the testing due to hx of T1DM, fam hx of osteoporosis and pt's personal hx of compression fracture of spin. Pt does not reports any CP, SOB, HA, dizziness, appetite or weight changes.   Current Outpatient Medications   Medication Sig Dispense Refill    Continuous Blood Gluc Sensor (DEXCOM G7 SENSOR) Does not apply Misc Use as directed every 10 days 9 each 1    insulin lispro (HUMALOG) 100 UNIT/ML Injection Solution Inject up to 100 units via insulin pump daily as directed. 90 mL 1    clotrimazole 1 % External Cream Apply 1 Application topically 2 (two) times daily. 12 g 0    insulin glargine (LANTUS SOLOSTAR) 100 UNIT/ML Subcutaneous Solution Pen-injector Inject 40 Units into the skin nightly. 36 mL 0    Urine Glucose-Ketones Test In Vitro Strip Use if BG > 250 50 strip 0    Insulin Aspart, w/Niacinamide, (FIASP) 100 UNIT/ML Injection Solution 100 Units by Other route daily. 20 mL 11    Glucose Blood (BLOOD GLUCOSE TEST STRIPS 333) In Vitro Strip 1 each by In Vitro route 3 (three) times daily. 100 strip 11    Continuous Blood Gluc Sensor (DEXCOM G6 SENSOR) Does not apply Misc 1 each Every 10 days. Use as directed every 10 days 3 each 11    Continuous Blood Gluc Transmit (DEXCOM G6 TRANSMITTER) Does not apply Misc 1 each every 3 (three) months. 1 each 1    lisdexamfetamine 30 MG Oral Cap       insulin aspart 100 Units/mL Injection Solution INJECT UP TO 70 UNITS UNDER THE SKIN DAILY VIA  MEDTRONIC 670G INSULIN PUMP. OK to change to Humalog if Novolog not covered      sertraline 50 MG Oral Tab         Past Medical History:    Type 1 diabetes mellitus (HCC)      Social History:  Social History     Socioeconomic History    Marital status:    Tobacco Use    Smoking status: Never    Smokeless tobacco: Never   Vaping Use    Vaping status: Never Used   Substance and Sexual Activity    Alcohol use: Not Currently    Drug use: Not Currently        REVIEW OF SYSTEMS:   Review of Systems   All other systems reviewed and are negative.         EXAM:   There were no vitals taken for this visit.    Physical Exam  Constitutional:       General: He is not in acute distress.  Pulmonary:      Effort: Pulmonary effort is normal. No respiratory distress.   Neurological:      Mental Status: He is alert and oriented to person, place, and time.   Psychiatric:         Mood and Affect: Mood normal.         Judgment: Judgment normal.            ASSESSMENT AND PLAN:   1. Family history of osteoporosis  -DEXA ordered.   - XR DEXA BONE DENSITOMETRY (CPT=77080); Future    2. History of compression fracture of spine  -DEXA ordered.   - XR DEXA BONE DENSITOMETRY (CPT=77080); Future       The patient indicates understanding of these issues and agrees to the plan.  The patient is asked to return in Annual exam PRN.     The above note was creating using Dragon speech recognition technology. Please excuse any typos.

## 2024-05-02 NOTE — TELEPHONE ENCOUNTER
Patient scheduled himself for DEXA scan tomorrow.  The check in desk at [facility] told him he did not have a provider's order for the DEXA, and told him to call PCP office.  He states he saw Dr Royal Taylor, spine specialist last year at [facility], who recommended it. He is type 1 diabetic, hx spine fracture, and family history osteoporosis.  He was working in his garden over the weekend and hurt his ribs doing minor chores, so he wanted to go forward with DEXA to check for osteoporosis.    This RN scheduled him for video visit today at 4:20 with Noelle HUMPHRIES. ([facility])  He states will need the order in system by 4:30 pm or they will cancel him tomorrow.    Noelle HUMPHRIES, please advise if you can order DEXA before 4:30 pm today?  You saw him 10/16/2023 for wellness exam.  Pended for review.        10/07/2024   Narrative   PROCEDURE: XR THORACIC SPINE (3 VIEWS) (CPT=72072)     COMPARISON: None.     INDICATIONS: Thoracic spine pain post jumping on trampoline today.     TECHNIQUE: Thoracic spine radiographs (3 views)     FINDINGS: Approximate 20-25 percent compression fracture of T8.  Normal alignment.         6/24/2019  Vit D level 16.4

## 2024-05-03 ENCOUNTER — HOSPITAL ENCOUNTER (OUTPATIENT)
Dept: BONE DENSITY | Age: 34
Discharge: HOME | End: 2024-05-03
Attending: NURSE PRACTITIONER
Payer: COMMERCIAL

## 2024-05-03 ENCOUNTER — LAB ENCOUNTER (OUTPATIENT)
Dept: LAB | Age: 34
End: 2024-05-03
Attending: INTERNAL MEDICINE
Payer: COMMERCIAL

## 2024-05-03 DIAGNOSIS — D72.829 LEUKOCYTOSIS, UNSPECIFIED TYPE: ICD-10-CM

## 2024-05-03 DIAGNOSIS — E10.65 UNCONTROLLED TYPE 1 DIABETES MELLITUS WITH HYPERGLYCEMIA (HCC): ICD-10-CM

## 2024-05-03 DIAGNOSIS — Z82.62 FAMILY HISTORY OF OSTEOPOROSIS: ICD-10-CM

## 2024-05-03 DIAGNOSIS — Z87.81 HISTORY OF COMPRESSION FRACTURE OF SPINE: ICD-10-CM

## 2024-05-03 LAB
BASOPHILS # BLD AUTO: 0.09 X10(3) UL (ref 0–0.2)
BASOPHILS NFR BLD AUTO: 1.2 %
DEPRECATED RDW RBC AUTO: 40.4 FL (ref 35.1–46.3)
EOSINOPHIL # BLD AUTO: 0.31 X10(3) UL (ref 0–0.7)
EOSINOPHIL NFR BLD AUTO: 4.1 %
ERYTHROCYTE [DISTWIDTH] IN BLOOD BY AUTOMATED COUNT: 12 % (ref 11–15)
EST. AVERAGE GLUCOSE BLD GHB EST-MCNC: 163 MG/DL (ref 68–126)
HBA1C MFR BLD: 7.3 % (ref ?–5.7)
HCT VFR BLD AUTO: 46.9 %
HGB BLD-MCNC: 16.4 G/DL
IMM GRANULOCYTES # BLD AUTO: 0.01 X10(3) UL (ref 0–1)
IMM GRANULOCYTES NFR BLD: 0.1 %
LYMPHOCYTES # BLD AUTO: 3.18 X10(3) UL (ref 1–4)
LYMPHOCYTES NFR BLD AUTO: 41.8 %
MCH RBC QN AUTO: 31.9 PG (ref 26–34)
MCHC RBC AUTO-ENTMCNC: 35 G/DL (ref 31–37)
MCV RBC AUTO: 91.2 FL
MONOCYTES # BLD AUTO: 0.74 X10(3) UL (ref 0.1–1)
MONOCYTES NFR BLD AUTO: 9.7 %
NEUTROPHILS # BLD AUTO: 3.28 X10 (3) UL (ref 1.5–7.7)
NEUTROPHILS # BLD AUTO: 3.28 X10(3) UL (ref 1.5–7.7)
NEUTROPHILS NFR BLD AUTO: 43.1 %
PLATELET # BLD AUTO: 391 10(3)UL (ref 150–450)
RBC # BLD AUTO: 5.14 X10(6)UL
WBC # BLD AUTO: 7.6 X10(3) UL (ref 4–11)

## 2024-05-03 PROCEDURE — 83036 HEMOGLOBIN GLYCOSYLATED A1C: CPT

## 2024-05-03 PROCEDURE — 36415 COLL VENOUS BLD VENIPUNCTURE: CPT

## 2024-05-03 PROCEDURE — 77080 DXA BONE DENSITY AXIAL: CPT | Performed by: NURSE PRACTITIONER

## 2024-05-03 PROCEDURE — 85025 COMPLETE CBC W/AUTO DIFF WBC: CPT

## 2024-05-06 ENCOUNTER — LAB ENCOUNTER (OUTPATIENT)
Dept: LAB | Age: 34
End: 2024-05-06
Attending: NURSE PRACTITIONER
Payer: COMMERCIAL

## 2024-05-06 ENCOUNTER — OFFICE VISIT (OUTPATIENT)
Dept: INTERNAL MEDICINE CLINIC | Facility: CLINIC | Age: 34
End: 2024-05-06
Payer: COMMERCIAL

## 2024-05-06 ENCOUNTER — HOSPITAL ENCOUNTER (OUTPATIENT)
Dept: GENERAL RADIOLOGY | Age: 34
Discharge: HOME | End: 2024-05-06
Attending: NURSE PRACTITIONER
Payer: COMMERCIAL

## 2024-05-06 VITALS
BODY MASS INDEX: 25.04 KG/M2 | WEIGHT: 159.56 LBS | HEART RATE: 80 BPM | RESPIRATION RATE: 16 BRPM | DIASTOLIC BLOOD PRESSURE: 88 MMHG | SYSTOLIC BLOOD PRESSURE: 134 MMHG | HEIGHT: 67 IN

## 2024-05-06 DIAGNOSIS — M81.0 OSTEOPOROSIS, UNSPECIFIED OSTEOPOROSIS TYPE, UNSPECIFIED PATHOLOGICAL FRACTURE PRESENCE: ICD-10-CM

## 2024-05-06 DIAGNOSIS — R07.81 RIB PAIN: ICD-10-CM

## 2024-05-06 DIAGNOSIS — M81.0 OSTEOPOROSIS, UNSPECIFIED OSTEOPOROSIS TYPE, UNSPECIFIED PATHOLOGICAL FRACTURE PRESENCE: Primary | ICD-10-CM

## 2024-05-06 LAB
ALBUMIN SERPL-MCNC: 4.9 G/DL (ref 3.2–4.8)
ALBUMIN/GLOB SERPL: 1.7 {RATIO} (ref 1–2)
ALP LIVER SERPL-CCNC: 57 U/L
ALT SERPL-CCNC: 14 U/L
ANION GAP SERPL CALC-SCNC: 7 MMOL/L (ref 0–18)
AST SERPL-CCNC: 20 U/L (ref ?–34)
BILIRUB SERPL-MCNC: 0.6 MG/DL (ref 0.3–1.2)
BUN BLD-MCNC: 12 MG/DL (ref 9–23)
BUN/CREAT SERPL: 15 (ref 10–20)
CALCIUM BLD-MCNC: 10.5 MG/DL (ref 8.7–10.4)
CALCIUM SERPL-MCNC: 17.5 MG/DL
CHLORIDE SERPL-SCNC: 109 MMOL/L (ref 98–112)
CO2 SERPL-SCNC: 28 MMOL/L (ref 21–32)
CREAT BLD-MCNC: 0.8 MG/DL
EGFRCR SERPLBLD CKD-EPI 2021: 119 ML/MIN/1.73M2 (ref 60–?)
FASTING STATUS PATIENT QL REPORTED: NO
GLOBULIN PLAS-MCNC: 2.9 G/DL (ref 2–3.5)
GLUCOSE BLD-MCNC: 89 MG/DL (ref 70–99)
OSMOLALITY SERPL CALC.SUM OF ELEC: 297 MOSM/KG (ref 275–295)
PHOSPHATE SERPL-MCNC: 4 MG/DL (ref 2.4–5.1)
POTASSIUM SERPL-SCNC: 4.5 MMOL/L (ref 3.5–5.1)
PROT SERPL-MCNC: 7.8 G/DL (ref 5.7–8.2)
RHEUMATOID FACT SERPL-ACNC: <10 IU/ML (ref ?–14)
SODIUM SERPL-SCNC: 144 MMOL/L (ref 136–145)
VIT D+METAB SERPL-MCNC: 45.1 NG/ML (ref 30–100)

## 2024-05-06 PROCEDURE — 36415 COLL VENOUS BLD VENIPUNCTURE: CPT

## 2024-05-06 PROCEDURE — 86431 RHEUMATOID FACTOR QUANT: CPT

## 2024-05-06 PROCEDURE — 84402 ASSAY OF FREE TESTOSTERONE: CPT

## 2024-05-06 PROCEDURE — 82310 ASSAY OF CALCIUM: CPT

## 2024-05-06 PROCEDURE — 99214 OFFICE O/P EST MOD 30 MIN: CPT | Performed by: NURSE PRACTITIONER

## 2024-05-06 PROCEDURE — 71101 X-RAY EXAM UNILAT RIBS/CHEST: CPT | Performed by: NURSE PRACTITIONER

## 2024-05-06 PROCEDURE — 84403 ASSAY OF TOTAL TESTOSTERONE: CPT

## 2024-05-06 PROCEDURE — 82306 VITAMIN D 25 HYDROXY: CPT

## 2024-05-06 PROCEDURE — 80053 COMPREHEN METABOLIC PANEL: CPT

## 2024-05-06 PROCEDURE — 84100 ASSAY OF PHOSPHORUS: CPT

## 2024-05-06 RX ORDER — DEXTROAMPHETAMINE SULFATE, DEXTROAMPHETAMINE SACCHARATE, AMPHETAMINE SULFATE AND AMPHETAMINE ASPARTATE 7.5; 7.5; 7.5; 7.5 MG/1; MG/1; MG/1; MG/1
CAPSULE, EXTENDED RELEASE ORAL
COMMUNITY
Start: 2024-04-03

## 2024-05-06 RX ORDER — SERTRALINE HYDROCHLORIDE 100 MG/1
TABLET, FILM COATED ORAL
COMMUNITY
Start: 2024-03-25

## 2024-05-06 RX ORDER — DEXTROAMPHETAMINE SACCHARATE, AMPHETAMINE ASPARTATE, DEXTROAMPHETAMINE SULFATE AND AMPHETAMINE SULFATE 2.5; 2.5; 2.5; 2.5 MG/1; MG/1; MG/1; MG/1
TABLET ORAL
COMMUNITY
Start: 2024-03-25

## 2024-05-06 NOTE — PROGRESS NOTES
Vasiliy Adams is a 33 year old male.  HPI:   Pt presents to clinic for follow up on his recent DEXA results.   Pt has a hx of compression fracture. Mother has osteoporosis. Hx of T1DM. Saw a spinal specialist one year ago who recommended he complete bone density testing after his compression fracture. Pt denies any CP, SOB, HA, dizziness, vision changes, palpitations, edema, appetite or weight changes. Hx of alcohol abuse from ages [age]. Reports abstinent from alcohol use or past 1 year. No cravings reported. Taking daily multivitamin, calcium/D3K2 supplement. He rpeorts one week ago he was gardening and felt a pain in his R rib. Now has tenderness when touching the R ribs and pain with deep breaths.     XR DEXA BONE DENSITOMETRY (CPT=77080)    Result Date: 5/3/2024  CONCLUSION:  1. Findings in the left femoral neck and total left hip suggest osteopenia, there may be increased fracture risk.  The 10 year fracture risk for major osteoporotic fracture is 8.3%, and for hip fracture is 4.6%. 2. Findings in the total AP lumbar spine suggest osteoporosis, and there is increased fracture risk.    Dictated by (CST): Bran Washington MD on 5/03/2024 at 5:40 PM     Finalized by (CST): Bran Washington MD on 5/03/2024 at 5:41 PM          Current Outpatient Medications   Medication Sig Dispense Refill    ADDERALL XR 30 MG Oral Capsule SR 24 Hr       amphetamine-dextroamphetamine 10 MG Oral Tab       sertraline 100 MG Oral Tab       insulin lispro (HUMALOG) 100 UNIT/ML Injection Solution Inject up to 100 units via insulin pump daily as directed. 90 mL 1    Insulin Aspart, w/Niacinamide, (FIASP) 100 UNIT/ML Injection Solution 100 Units by Other route daily. 20 mL 11    insulin aspart 100 Units/mL Injection Solution INJECT UP TO 70 UNITS UNDER THE SKIN DAILY VIA MEDTRONIC 670G INSULIN PUMP. OK to change to Humalog if Novolog not covered      Continuous Blood Gluc Sensor (DEXCOM G7 SENSOR) Does not apply Misc Use as directed every  10 days (Patient not taking: Reported on 5/6/2024) 9 each 1    clotrimazole 1 % External Cream Apply 1 Application topically 2 (two) times daily. (Patient not taking: Reported on 5/6/2024) 12 g 0    insulin glargine (LANTUS SOLOSTAR) 100 UNIT/ML Subcutaneous Solution Pen-injector Inject 40 Units into the skin nightly. 36 mL 0    Urine Glucose-Ketones Test In Vitro Strip Use if BG > 250 (Patient not taking: Reported on 5/6/2024) 50 strip 0    Glucose Blood (BLOOD GLUCOSE TEST STRIPS 333) In Vitro Strip 1 each by In Vitro route 3 (three) times daily. (Patient not taking: Reported on 5/6/2024) 100 strip 11    Continuous Blood Gluc Sensor (DEXCOM G6 SENSOR) Does not apply Misc 1 each Every 10 days. Use as directed every 10 days (Patient not taking: Reported on 5/6/2024) 3 each 11    Continuous Blood Gluc Transmit (DEXCOM G6 TRANSMITTER) Does not apply Misc 1 each every 3 (three) months. (Patient not taking: Reported on 5/6/2024) 1 each 1    lisdexamfetamine 30 MG Oral Cap  (Patient not taking: Reported on 5/6/2024)      sertraline 50 MG Oral Tab  (Patient not taking: Reported on 5/6/2024)        Past Medical History:    Type 1 diabetes mellitus (HCC)      Social History:  Social History     Socioeconomic History    Marital status:    Tobacco Use    Smoking status: Never    Smokeless tobacco: Never   Vaping Use    Vaping status: Never Used   Substance and Sexual Activity    Alcohol use: Not Currently    Drug use: Not Currently        REVIEW OF SYSTEMS:   Review of Systems   All other systems reviewed and are negative.         EXAM:   /88   Pulse 80   Resp 16   Ht 5' 7\" (1.702 m)   Wt 159 lb 9 oz (72.4 kg)   BMI 24.99 kg/m²     Physical Exam  Vitals reviewed.   Constitutional:       General: He is not in acute distress.  Eyes:      Conjunctiva/sclera: Conjunctivae normal.      Pupils: Pupils are equal, round, and reactive to light.   Cardiovascular:      Rate and Rhythm: Normal rate and regular rhythm.       Pulses: Normal pulses.      Heart sounds: Normal heart sounds.   Pulmonary:      Effort: Pulmonary effort is normal. No respiratory distress.      Breath sounds: Normal breath sounds.   Chest:      Chest wall: Tenderness present. No mass.      Comments: R lower ribs  Musculoskeletal:         General: Normal range of motion.   Skin:     General: Skin is warm.      Coloration: Skin is not jaundiced.      Findings: No bruising or erythema.   Neurological:      Mental Status: He is alert and oriented to person, place, and time.   Psychiatric:         Mood and Affect: Mood normal.         Judgment: Judgment normal.            ASSESSMENT AND PLAN:   1. Osteoporosis, unspecified osteoporosis type, unspecified pathological fracture presence  -Additional labs ordered.   -Referral to Endo for further management and tx.   -Avoid high impact sports/activities. Reviewed safety.   - Endocrine Referral - In Network  - Testosterone, Total And Free  [E]; Future  - Comp Metabolic Panel (14) [E]; Future  - Rheumatoid Arthritis Factor [E]; Future  - Phosphorus [E]; Future  - Vitamin D [E]; Future  - Calcium, Random Urine [E]; Future    2. Rib pain  -Will proceed with imaging to r/ fracture given osteoporosis diagnosis.   - XR RIBS WITH CHEST (3 VIEWS), RIGHT  (CPT=71101); Future       The patient indicates understanding of these issues and agrees to the plan.  The patient is asked to return in 3-6 months.     The above note was creating using Dragon speech recognition technology. Please excuse any typos.

## 2024-05-07 ENCOUNTER — LAB ENCOUNTER (OUTPATIENT)
Dept: LAB | Age: 34
End: 2024-05-07
Attending: INTERNAL MEDICINE
Payer: COMMERCIAL

## 2024-05-07 ENCOUNTER — OFFICE VISIT (OUTPATIENT)
Facility: LOCATION | Age: 34
End: 2024-05-07
Payer: COMMERCIAL

## 2024-05-07 VITALS
OXYGEN SATURATION: 98 % | HEIGHT: 67 IN | WEIGHT: 157 LBS | HEART RATE: 102 BPM | BODY MASS INDEX: 24.64 KG/M2 | DIASTOLIC BLOOD PRESSURE: 78 MMHG | SYSTOLIC BLOOD PRESSURE: 128 MMHG

## 2024-05-07 DIAGNOSIS — M81.8 OTHER OSTEOPOROSIS WITHOUT CURRENT PATHOLOGICAL FRACTURE: ICD-10-CM

## 2024-05-07 DIAGNOSIS — E83.52 HIGH CALCIUM LEVELS: ICD-10-CM

## 2024-05-07 DIAGNOSIS — E10.65 UNCONTROLLED TYPE 1 DIABETES MELLITUS WITH HYPERGLYCEMIA (HCC): ICD-10-CM

## 2024-05-07 DIAGNOSIS — E83.52 HYPERCALCEMIA: ICD-10-CM

## 2024-05-07 DIAGNOSIS — E10.65 UNCONTROLLED TYPE 1 DIABETES MELLITUS WITH HYPERGLYCEMIA (HCC): Primary | ICD-10-CM

## 2024-05-07 DIAGNOSIS — E10.649 UNCONTROLLED TYPE 1 DIABETES MELLITUS WITH HYPOGLYCEMIA WITHOUT COMA (HCC): ICD-10-CM

## 2024-05-07 DIAGNOSIS — R79.89 LOW VITAMIN D LEVEL: ICD-10-CM

## 2024-05-07 DIAGNOSIS — S22.068S OTHER CLOSED FRACTURE OF EIGHTH THORACIC VERTEBRA, SEQUELA: ICD-10-CM

## 2024-05-07 LAB
ALBUMIN SERPL-MCNC: 4.8 G/DL (ref 3.2–4.8)
ALBUMIN/GLOB SERPL: 1.7 {RATIO} (ref 1–2)
ALP LIVER SERPL-CCNC: 60 U/L
ALT SERPL-CCNC: 15 U/L
ANION GAP SERPL CALC-SCNC: 6 MMOL/L (ref 0–18)
AST SERPL-CCNC: 18 U/L (ref ?–34)
BILIRUB SERPL-MCNC: 0.9 MG/DL (ref 0.3–1.2)
BUN BLD-MCNC: 12 MG/DL (ref 9–23)
BUN/CREAT SERPL: 13.8 (ref 10–20)
CALCIUM BLD-MCNC: 10.3 MG/DL (ref 8.7–10.4)
CHLORIDE SERPL-SCNC: 106 MMOL/L (ref 98–112)
CO2 SERPL-SCNC: 29 MMOL/L (ref 21–32)
CORTIS SERPL-MCNC: 12.3 UG/DL
CREAT BLD-MCNC: 0.87 MG/DL
EGFRCR SERPLBLD CKD-EPI 2021: 116 ML/MIN/1.73M2 (ref 60–?)
FASTING STATUS PATIENT QL REPORTED: YES
GLOBULIN PLAS-MCNC: 2.9 G/DL (ref 2–3.5)
GLUCOSE BLD-MCNC: 226 MG/DL (ref 70–99)
IGA SERPL-MCNC: 437.6 MG/DL (ref 70–312)
MAGNESIUM SERPL-MCNC: 1.9 MG/DL (ref 1.6–2.6)
OSMOLALITY SERPL CALC.SUM OF ELEC: 299 MOSM/KG (ref 275–295)
PHOSPHATE SERPL-MCNC: 3.5 MG/DL (ref 2.4–5.1)
POTASSIUM SERPL-SCNC: 4.6 MMOL/L (ref 3.5–5.1)
PROT SERPL-MCNC: 7.7 G/DL (ref 5.7–8.2)
PTH-INTACT SERPL-MCNC: 19.6 PG/ML (ref 18.5–88)
SODIUM SERPL-SCNC: 141 MMOL/L (ref 136–145)
TSI SER-ACNC: 1.23 MIU/ML (ref 0.55–4.78)

## 2024-05-07 PROCEDURE — 83970 ASSAY OF PARATHORMONE: CPT | Performed by: INTERNAL MEDICINE

## 2024-05-07 PROCEDURE — 84100 ASSAY OF PHOSPHORUS: CPT | Performed by: INTERNAL MEDICINE

## 2024-05-07 PROCEDURE — 83735 ASSAY OF MAGNESIUM: CPT | Performed by: INTERNAL MEDICINE

## 2024-05-07 PROCEDURE — 95251 CONT GLUC MNTR ANALYSIS I&R: CPT | Performed by: INTERNAL MEDICINE

## 2024-05-07 PROCEDURE — 99214 OFFICE O/P EST MOD 30 MIN: CPT | Performed by: INTERNAL MEDICINE

## 2024-05-07 PROCEDURE — 86364 TISS TRNSGLTMNASE EA IG CLAS: CPT

## 2024-05-07 PROCEDURE — 36415 COLL VENOUS BLD VENIPUNCTURE: CPT | Performed by: INTERNAL MEDICINE

## 2024-05-07 PROCEDURE — 84443 ASSAY THYROID STIM HORMONE: CPT | Performed by: INTERNAL MEDICINE

## 2024-05-07 PROCEDURE — 82533 TOTAL CORTISOL: CPT | Performed by: INTERNAL MEDICINE

## 2024-05-07 PROCEDURE — 82784 ASSAY IGA/IGD/IGG/IGM EACH: CPT

## 2024-05-07 PROCEDURE — 80053 COMPREHEN METABOLIC PANEL: CPT | Performed by: INTERNAL MEDICINE

## 2024-05-07 NOTE — PATIENT INSTRUCTIONS
DXA showed osteoporosis   Will do w/u and discuss next visit   RTC in 2-3 weeks     - fasting AM labs, water is ok   - 24 hr urine collection     How to Collect the 24-hour Urine Specimen  Decide on a day to do the test.  On the day of the test, patient empty bladder (urinate or pee) in the toilet right after waking up. Flush the urine down the toilet.  The test begins now with the bladder empty. Write this date and the start time on the storage container’s label.  For the next 24 hours, patient will need to pee into a collection container every time they go to the bathroom. Females can use a toilet hat. Males can use a plastic urinal or pee right into the large storage container. If you do not have a toilet hat or urinal at home, you may use some other clean plastic container- or get them from labs   Before using the plastic container for the first time, wash it with dish soap and then rinse at least 10 times with tap water. Allow it to air dry completely.  Do not let feces (poop) mix with the urine or else the test will need to be restarted.  Pour the urine into the large storage container and close the lid tightly. Be very careful not to spill any urine.  If using a collection container, rinse it with water only. Put it back by the toilet to remind you to use it the next time. Allow it to air dry completely.  Put the large storage container in the refrigerator ( or in put ice around the container and place in larger container). The urine must be kept cool at all times. If you do not have space in the refrigerator, you can store it in a cooler on top of Add more ice as needed to keep the urine cold.  Each time patient pees during the day and night, follow steps to collect urine.  The next day (close to the same time that you started on the first day), patient needs to pee into the collection container one last time. Add it to the large storage container. This ends the 24-hour collection.  Write the date and time  of this last urine collection on the label.  Attach a list of all medicines, including over-the-counter medicines, vitamins and herbal remedies, patient took during the 24-hour urine collection.    Take the urine to a Laboratory (Lab) Service Center as soon as possible, within 24 hours after ending the collection. Keep the urine cool.  Make sure the urine does not freeze for these tests: amylase, arylsulfatase, immunoelectrophoresis, micro-albumin, pregnanetriol, protein or uric acid.  You will need to start the test over if any of the urine spilled, you forgot to save some or it has poop in it. If you must restart the test, it is okay to use the same collection and storage containers. Pour out the urine, clean the containers well and allow them to air dry. Then follow       Take vitamin D 7207-2069 international unit a day and calcium 600 mg twice a day or 3 servings of dairy a day   Do weight bearing exercise   Follow fall precautions       . 14 day CGM data showed   GMI  8.2 %  TIR  42 %  high 32 %  low %  very high 25 %  Very low   %

## 2024-05-07 NOTE — PROGRESS NOTES
Reason for Visit:  t1DM.    Requesting Physician:   ..Maureen Guerra MD    CHIEF COMPLAINT:  t1DM on pump     HISTORY OF PRESENT ILLNESS:   Vasiliy Adams is a 33 year old male who presents with t1DM and osteoporosis     Was seeing Endo at  [facility]    10/2023 T8 fracture from trampoline   DXA was done 2024 showed osteoporosis   Saw spine surgeon  Pending labs from PCP          t1DM dx when 12   DKA x2. Last in   Has been on pump since . Omnipod, [employer]Integral[employer]ach and now on pump 770g ~ 2018    He wants to switch to 780G but open to try new ones. He contacted [employer] already  Needs a new endocrinologist  Does not like the alarms from [facility].     Does not go on pump holiday    Has ketone strips   He thinks his correction was aggressive so increased from 29 to 34.   He missed lunch or on the go so misses bolus for it       Bolus   ICR 6.3           insulin carb ratio   ISF 3334             insulin sensitivity factor   AIT 4.             active insulin time   Target     Basal 36.8 unit/day  MN 1.3  3AM  1.4  7AM 1.8  330PM 1.55        A1c 9.6, tsh, lipid, CMp  Neg alb/creat      SSx   Works for [employer],   Travels for work      3 yo daughter   Vaping   No etoh now - 8 mo stopped   No marijuana   No drugs       T8 Vertebrae fracture - saw spine specialist for that. 2024   Takes vit D and Ca daily now         DM quality measures:  A1C/Blood pressure: as reported above   Nephropathy screenin2023 neg LIPID screenin2023  Last dilated eye exam: 2022  data recorded  Last diabetic foot exam: 2023  Dentist : recommend every 6m      ASSESSMENT AND PLAN:      Uncontrolled t1DM on pump  Hypo and hyperglycemia   Osteoporosis with T8 fracture. Will get w/u and discuss more next visit.     Pump and CGM data were reviewed   Will pay attention to carb bolus  UTD on foot exam and needs eye exam now     plan  DXA showed osteoporosis   Will do w/u and discuss next visit   RTC in 2-3 weeks      - fasting AM labs, water is ok   - 24 hr urine collection     How to Collect the 24-hour Urine Specimen  Decide on a day to do the test.  On the day of the test, patient empty bladder (urinate or pee) in the toilet right after waking up. Flush the urine down the toilet.  The test begins now with the bladder empty. Write this date and the start time on the storage container’s label.  For the next 24 hours, patient will need to pee into a collection container every time they go to the bathroom. Females can use a toilet hat. Males can use a plastic urinal or pee right into the large storage container. If you do not have a toilet hat or urinal at home, you may use some other clean plastic container- or get them from labs   Before using the plastic container for the first time, wash it with dish soap and then rinse at least 10 times with tap water. Allow it to air dry completely.  Do not let feces (poop) mix with the urine or else the test will need to be restarted.  Pour the urine into the large storage container and close the lid tightly. Be very careful not to spill any urine.  If using a collection container, rinse it with water only. Put it back by the toilet to remind you to use it the next time. Allow it to air dry completely.  Put the large storage container in the refrigerator ( or in put ice around the container and place in larger container). The urine must be kept cool at all times. If you do not have space in the refrigerator, you can store it in a cooler on top of Add more ice as needed to keep the urine cold.  Each time patient pees during the day and night, follow steps to collect urine.  The next day (close to the same time that you started on the first day), patient needs to pee into the collection container one last time. Add it to the large storage container. This ends the 24-hour collection.  Write the date and time of this last urine collection on the label.  Attach a list of all medicines,  including over-the-counter medicines, vitamins and herbal remedies, patient took during the 24-hour urine collection.    Take the urine to a Laboratory (Lab) Service Center as soon as possible, within 24 hours after ending the collection. Keep the urine cool.  Make sure the urine does not freeze for these tests: amylase, arylsulfatase, immunoelectrophoresis, micro-albumin, pregnanetriol, protein or uric acid.  You will need to start the test over if any of the urine spilled, you forgot to save some or it has poop in it. If you must restart the test, it is okay to use the same collection and storage containers. Pour out the urine, clean the containers well and allow them to air dry. Then follow       Take vitamin D 9476-5633 international unit a day and calcium 600 mg twice a day or 3 servings of dairy a day   Do weight bearing exercise   Follow fall precautions       . 14 day CGM data showed   GMI  8.2 %  TIR  42 %  high 32 %  low %  very high 25 %  Very low   %    New pump settings   Bolus   ICR 6.3             insulin carb ratio   ISF 34 - 33       insulin sensitivity factor   AIT 4         active insulin time   Target 130    Basal 37.8 unit/day  MN 1.3  3AM  1.4  7AM 1.8  330PM 1.55      PAST MEDICAL HISTORY:   T1dm  T8 Vertebrae fracture - saw spine specialist for that. 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 90 mL 1    Urine Glucose-Ketones Test In  Vitro Strip Use if BG > 250 50 strip 0    Insulin Aspart, w/Niacinamide, (FIASP) 100 UNIT/ML Injection Solution 100 Units by Other route daily. 20 mL 11    Glucose Blood (BLOOD GLUCOSE TEST STRIPS 333) In Vitro Strip 1 each by In Vitro route 3 (three) times daily. 100 strip 11    insulin aspart 100 Units/mL Injection Solution INJECT UP TO 70 UNITS UNDER THE SKIN DAILY VIA MEDTRONIC 670G INSULIN PUMP. OK to change to Humalog if Novolog not covered      clotrimazole 1 % External Cream Apply 1 Application topically 2 (two) times daily. (Patient not taking: Reported on 5/7/2024) 12 g 0    insulin glargine (LANTUS SOLOSTAR) 100 UNIT/ML Subcutaneous Solution Pen-injector Inject 40 Units into the skin nightly. 36 mL 0    Continuous Blood Gluc Sensor (DEXCOM G6 SENSOR) Does not apply Misc 1 each Every 10 days. Use as directed every 10 days (Patient not taking: Reported on 5/7/2024) 3 each 11    Continuous Blood Gluc Transmit (DEXCOM G6 TRANSMITTER) Does not apply Misc 1 each every 3 (three) months. (Patient not taking: Reported on 5/6/2024) 1 each 1    lisdexamfetamine 30 MG Oral Cap  (Patient not taking: Reported on 5/6/2024)      sertraline 50 MG Oral Tab  (Patient not taking: Reported on 5/7/2024)         ALLERGIES:  No Known Allergies    SOCIAL HISTORY:    Social History     Socioeconomic History    Marital status:    Tobacco Use    Smoking status: Never    Smokeless tobacco: Never   Vaping Use    Vaping status: Never Used   Substance and Sexual Activity    Alcohol use: Not Currently    Drug use: Not Currently       FAMILY HISTORY:     T1dm in PGF  Cousin with t1DM had HD and passed a way from complications   F prostate ca in remission now  P uncle cancer ?  MGF lung ca  M osteoporosis, recovery from etoh, adult ADD  Brother is in recovery from etoh         PHYSICAL EXAM:   Height: 5' 7\" (170.2 cm) (05/07 0833)  Weight: 157 lb (71.2 kg) (05/07 0833)  BSA (Calculated - sq m): 1.82 sq meters (05/07 0833)  Pulse:  102 (05/07 0833)  BP: 128/78 (05/07 0833)  Temp: --  Do Not Use - Resp Rate: --  SpO2: 98 % (05/07 0833)      No goiter  No tremors  No lipohypertrophy   Pump site is on Rt back   Foot exam is normal - no ulcers, good pulse, good hair, normal monofilament test    CONSTITUTIONAL:  Awake and alert. Age appropriate, good hygiene not in acute distress. Well nourished and well developed. no acute distress   PSYCH:   Orientated to time, place, person & situation, Normal mood and affect, memory intact, normal insight and judgment, cooperative  Neuro: speech is clear. Awake, alert, no aphasia, no facial asymmetry, no nuchal rigidity  EYES:  No proptosis, no ptosis, conjunctiva normal       DATA:     Pertinent data reviewed    Orders Placed This Encounter   Procedures    Celiac Disease Screen Reflex    PTH, Intact    Calcium, 24Hr Urine    Creatinine, 24-Hour Urine    Comp Metabolic Panel (14)    Magnesium    Phosphorus    CORTISOL - AM    TSH W Reflex To Free T4    Creatinine and Cortisol, Urine        DXA 5/2024   Findings in the left femoral neck and total left hip suggest osteopenia, there may be increased fracture risk.  The 10 year fracture risk for major osteoporotic fracture is 8.3%, and for hip fracture is 4.6%.   2. Findings in the total AP lumbar spine suggest osteoporosis, and there is increased fracture risk    Latest Reference Range & Units 05/03/24 08:38 05/06/24 10:12   Glucose 70 - 99 mg/dL  89   HEMOGLOBIN A1c <5.7 % 7.3 (H)    ESTIMATED AVERAGE GLUCOSE 68 - 126 mg/dL 163 (H)    Sodium 136 - 145 mmol/L  144   Potassium 3.5 - 5.1 mmol/L  4.5   Chloride 98 - 112 mmol/L  109   Carbon Dioxide, Total 21.0 - 32.0 mmol/L  28.0   BUN 9 - 23 mg/dL  12   CREATININE 0.70 - 1.30 mg/dL  0.80   CALCIUM 8.7 - 10.4 mg/dL  10.5 (H)   BUN/CREATININE RATIO 10.0 - 20.0   15.0   EGFR >=60 mL/min/1.73m2  119   ANION GAP 0 - 18 mmol/L  7   CALCULATED OSMOLALITY 275 - 295 mOsm/kg  297 (H)   ALKALINE PHOSPHATASE 45 - 117 U/L  57    AST (SGOT) <=34 U/L  20   ALT (SGPT) 10 - 49 U/L  14   Total Bilirubin 0.3 - 1.2 mg/dL  0.6   Globulin 2.0 - 3.5 g/dL  2.9   PHOSPHORUS 2.4 - 5.1 mg/dL  4.0      Latest Reference Range & Units 05/06/24 10:12   A/G Ratio 1.0 - 2.0  1.7   PROTEIN, TOTAL 5.7 - 8.2 g/dL 7.8   Albumin 3.2 - 4.8 g/dL 4.9 (H)   VITAMIN D, 25-OH, TOTAL 30.0 - 100.0 ng/mL 45.1          Latest Reference Range & Units 11/20/23 12:44   TSH 0.550 - 4.780 mIU/mL 1.234      Latest Reference Range & Units 11/20/23 12:44 05/06/24 10:12   CALCIUM URINE RANDOM 0.9 - 37.9 mg/dL  17.5   CREATININE UR RANDOM mg/dL 50.20    MALB URINE mg/dL <0.30    MALB/CRE CALC  See chart for results                No results for input(s): \"TSH\", \"T4F\", \"T3F\", \"THYP\" in the last 72 hours.  XR RIBS WITH CHEST (3 VIEWS), RIGHT  (CPT=71101)    Result Date: 5/6/2024  CONCLUSION:  1. No acute fracture or dislocation. 2. Dextroscoliosis dorsal spine.  Moderate chronic T8 wedge compression.  3. No acute cardiopulmonary disease..    Dictated by (CST): Acosta Barrera MD on 5/06/2024 at 10:30 AM     Finalized by (CST): Acosta Barrera MD on 5/06/2024 at 10:34 AM           Orders Placed This Encounter   Procedures    Celiac Disease Screen Reflex    PTH, Intact    Calcium, 24Hr Urine    Creatinine, 24-Hour Urine    Comp Metabolic Panel (14)    Magnesium    Phosphorus    CORTISOL - AM    TSH W Reflex To Free T4    Creatinine and Cortisol, Urine     Orders Placed This Encounter    Celiac Disease Screen Reflex     Standing Status:   Future     Number of Occurrences:   1     Standing Expiration Date:   5/7/2025     Order Specific Question:   Release to patient     Answer:   Immediate    PTH, Intact     Order Specific Question:   Release to patient     Answer:   Immediate    Calcium, 24Hr Urine     Standing Status:   Future     Standing Expiration Date:   5/7/2025     Order Specific Question:   Release to patient     Answer:   Immediate    Creatinine, 24-Hour Urine     Order  Specific Question:   Release to patient     Answer:   Immediate    Comp Metabolic Panel (14)     Order Specific Question:   Release to patient     Answer:   Immediate    Magnesium     Order Specific Question:   Release to patient     Answer:   Immediate    Phosphorus     Order Specific Question:   Release to patient     Answer:   Immediate    CORTISOL - AM     Order Specific Question:   Release to patient     Answer:   Immediate    TSH W Reflex To Free T4     Order Specific Question:   Release to patient     Answer:   Immediate    Creatinine and Cortisol, Urine     Standing Status:   Future     Standing Expiration Date:   5/7/2025     Order Specific Question:   Release to patient     Answer:   Immediate          This is a specialized patient consultation in endocrinology and required comprehensive review of prior records, as well as current evaluation, with time required for consideration of complex endocrine issues and consultation. For this visit, I personally interviewed the patient, and family member if accompanied, performed the pertinent parts of the history and physical examination. ROS included screening for appropriate endocrine conditions.   Today's diagnosis and plan were reviewed in detail with the patient who states understanding and agrees with plan. I discussed with the patient possible diagnosis, differential diagnosis, need for work up, treatment options, alternatives and side effects.     Please see note for details about time spent which includes:   · pre-visit preparation  · reviewing records  · face to face time with the patient   · timely documentation of the encounter  · ordering medications/tests  · communication with care team  · care coordination    I appreciate the opportunity to be part of your patient's medical care and will keep you, as the referring and primary physicians, informed about the care of your patient. Please feel free to contact me should you have any questions.    The 21st  Century Cures Act makes medical notes like these available to patients in the interest of transparency. Please be advised this is a medical document. Medical documents are intended to carry relevant information, facts as evident, and the clinical opinion of the practitioner. The medical note is intended as peer to peer communication and may appear blunt or direct. It is written in medical language and may contain abbreviations or verbiage that are unfamiliar.   Juan Pablo Palmer MD

## 2024-05-08 ENCOUNTER — LAB ENCOUNTER (OUTPATIENT)
Dept: LAB | Age: 34
End: 2024-05-08
Attending: INTERNAL MEDICINE
Payer: COMMERCIAL

## 2024-05-08 DIAGNOSIS — E83.52 HIGH CALCIUM LEVELS: ICD-10-CM

## 2024-05-08 DIAGNOSIS — E10.649 UNCONTROLLED TYPE 1 DIABETES MELLITUS WITH HYPOGLYCEMIA WITHOUT COMA (HCC): ICD-10-CM

## 2024-05-08 DIAGNOSIS — S22.068S OTHER CLOSED FRACTURE OF EIGHTH THORACIC VERTEBRA, SEQUELA: ICD-10-CM

## 2024-05-08 DIAGNOSIS — M81.8 OTHER OSTEOPOROSIS WITHOUT CURRENT PATHOLOGICAL FRACTURE: ICD-10-CM

## 2024-05-08 DIAGNOSIS — E83.52 HYPERCALCEMIA: ICD-10-CM

## 2024-05-08 DIAGNOSIS — E10.65 UNCONTROLLED TYPE 1 DIABETES MELLITUS WITH HYPERGLYCEMIA (HCC): ICD-10-CM

## 2024-05-08 DIAGNOSIS — R79.89 LOW VITAMIN D LEVEL: ICD-10-CM

## 2024-05-08 LAB — TTG IGA SER-ACNC: 0.8 U/ML (ref ?–7)

## 2024-05-08 PROCEDURE — 82530 CORTISOL FREE: CPT

## 2024-05-08 PROCEDURE — 82340 ASSAY OF CALCIUM IN URINE: CPT

## 2024-05-08 PROCEDURE — 82570 ASSAY OF URINE CREATININE: CPT | Performed by: INTERNAL MEDICINE

## 2024-05-09 LAB
CALCIUM 24H UR-SRATE: 520 MG/24HR (ref 100–300)
CREAT UR-SCNC: 1.88 G/24 HR (ref 0.95–2.49)
SPECIMEN VOL UR: 3250 ML
SPECIMEN VOL UR: 3250 ML

## 2024-05-10 ENCOUNTER — PATIENT MESSAGE (OUTPATIENT)
Facility: LOCATION | Age: 34
End: 2024-05-10

## 2024-05-10 DIAGNOSIS — E21.3 HYPERPARATHYROIDISM (HCC): ICD-10-CM

## 2024-05-10 DIAGNOSIS — R73.9 HYPERGLYCEMIA: ICD-10-CM

## 2024-05-10 DIAGNOSIS — N20.0 KIDNEY STONES: ICD-10-CM

## 2024-05-10 DIAGNOSIS — E78.2 MIXED HYPERLIPIDEMIA: ICD-10-CM

## 2024-05-10 DIAGNOSIS — E83.52 HYPERCALCEMIA: ICD-10-CM

## 2024-05-10 DIAGNOSIS — E66.9 OBESITY (BMI 30.0-34.9): ICD-10-CM

## 2024-05-10 DIAGNOSIS — I10 ESSENTIAL HYPERTENSION: Primary | ICD-10-CM

## 2024-05-10 DIAGNOSIS — C56.9 MALIGNANT NEOPLASM OF OVARY, UNSPECIFIED LATERALITY (HCC): ICD-10-CM

## 2024-05-10 DIAGNOSIS — K76.0 FATTY LIVER DISEASE, NONALCOHOLIC: ICD-10-CM

## 2024-05-11 LAB
FREE TESTOST DIRECT: 10.2 PG/ML
TESTOSTERONE: 890 NG/DL

## 2024-05-14 ENCOUNTER — TELEPHONE (OUTPATIENT)
Dept: ENDOCRINOLOGY CLINIC | Facility: CLINIC | Age: 34
End: 2024-05-14

## 2024-05-14 NOTE — TELEPHONE ENCOUNTER
Patient asking when he should complete order for monoclonal and how he would proceed with getting that done. Please call at [phone],thanks.

## 2024-05-14 NOTE — TELEPHONE ENCOUNTER
I reviewed labs   Some are still pending   Can you please ask the pt do the new ones I ordered   Thanks

## 2024-05-14 NOTE — TELEPHONE ENCOUNTER
Dr. Palmer please see patient request below. Patient is scheduled for a follow up with you on 5/21/24.

## 2024-05-15 ENCOUNTER — TELEPHONE (OUTPATIENT)
Dept: FAMILY MEDICINE CLINIC | Facility: CLINIC | Age: 34
End: 2024-05-15

## 2024-05-15 ENCOUNTER — LAB ENCOUNTER (OUTPATIENT)
Dept: LAB | Age: 34
End: 2024-05-15
Attending: INTERNAL MEDICINE

## 2024-05-15 DIAGNOSIS — N20.0 KIDNEY STONE: ICD-10-CM

## 2024-05-15 DIAGNOSIS — E83.52 HYPERCALCEMIA: ICD-10-CM

## 2024-05-15 DIAGNOSIS — C56.9 MALIGNANT NEOPLASM OF OVARY, UNSPECIFIED LATERALITY (HCC): ICD-10-CM

## 2024-05-15 DIAGNOSIS — E83.52 HYPERCALCEMIA: Primary | ICD-10-CM

## 2024-05-15 DIAGNOSIS — K76.0 FATTY LIVER DISEASE, NONALCOHOLIC: ICD-10-CM

## 2024-05-15 DIAGNOSIS — I10 ESSENTIAL HYPERTENSION: Primary | ICD-10-CM

## 2024-05-15 DIAGNOSIS — K76.0 FATTY LIVER: ICD-10-CM

## 2024-05-15 DIAGNOSIS — N20.0 KIDNEY STONES: ICD-10-CM

## 2024-05-15 DIAGNOSIS — E66.9 OBESITY (BMI 30.0-34.9): ICD-10-CM

## 2024-05-15 DIAGNOSIS — I10 ESSENTIAL HYPERTENSION: ICD-10-CM

## 2024-05-15 DIAGNOSIS — E21.3 HYPERPARATHYROIDISM (HCC): ICD-10-CM

## 2024-05-15 DIAGNOSIS — R73.9 HYPERGLYCEMIA: ICD-10-CM

## 2024-05-15 DIAGNOSIS — E78.2 MIXED HYPERLIPIDEMIA: ICD-10-CM

## 2024-05-15 DIAGNOSIS — E66.9 OBESITY: ICD-10-CM

## 2024-05-15 DIAGNOSIS — I10 HYPERTENSION: Primary | ICD-10-CM

## 2024-05-15 LAB
CORTISOL FREE 24H UR: 42 UG/24 HR
CORTISOL FREE UR: 13 UG/L
CREAT 24H UR: 2022 MG/24 HR
CREAT UR: 62.2 MG/DL
PTH-INTACT SERPL-MCNC: 10.5 PG/ML (ref 18.5–88)
VIT D+METAB SERPL-MCNC: 39 NG/ML (ref 30–100)

## 2024-05-15 PROCEDURE — 86334 IMMUNOFIX E-PHORESIS SERUM: CPT

## 2024-05-15 PROCEDURE — 84165 PROTEIN E-PHORESIS SERUM: CPT

## 2024-05-15 PROCEDURE — 82306 VITAMIN D 25 HYDROXY: CPT

## 2024-05-15 PROCEDURE — 83970 ASSAY OF PARATHORMONE: CPT

## 2024-05-15 PROCEDURE — 83521 IG LIGHT CHAINS FREE EACH: CPT

## 2024-05-15 PROCEDURE — 36415 COLL VENOUS BLD VENIPUNCTURE: CPT

## 2024-05-17 LAB
ALBUMIN SERPL ELPH-MCNC: 4.68 G/DL (ref 3.75–5.21)
ALBUMIN/GLOB SERPL: 1.6 {RATIO} (ref 1–2)
ALPHA1 GLOB SERPL ELPH-MCNC: 0.24 G/DL (ref 0.19–0.46)
ALPHA2 GLOB SERPL ELPH-MCNC: 0.84 G/DL (ref 0.48–1.05)
B-GLOBULIN SERPL ELPH-MCNC: 0.87 G/DL (ref 0.68–1.23)
GAMMA GLOB SERPL ELPH-MCNC: 0.97 G/DL (ref 0.62–1.7)
KAPPA LC FREE SER-MCNC: 1.25 MG/DL (ref 0.33–1.94)
KAPPA LC FREE/LAMBDA FREE SER NEPH: 1.26 {RATIO} (ref 0.26–1.65)
LAMBDA LC FREE SERPL-MCNC: 0.99 MG/DL (ref 0.57–2.63)
PROT SERPL-MCNC: 7.6 G/DL (ref 5.7–8.2)

## 2024-05-18 NOTE — TELEPHONE ENCOUNTER
Pt completed additional lab work on 5/15/24.  Pt has an appointment as below.   Responded to pt that results will most likely be discussed during the visit.   MART Meredith    Future Appointments   Date Time Provider Department Center   5/21/2024 11:45 AM Juan Pablo Palmer MD [facility]

## 2024-05-20 NOTE — TELEPHONE ENCOUNTER
Poke with pt verified . Informed patient forms are ready to be picked up in the .   Made copy and place to scan.

## 2024-05-21 ENCOUNTER — LAB ENCOUNTER (OUTPATIENT)
Dept: LAB | Age: 34
End: 2024-05-21
Attending: INTERNAL MEDICINE

## 2024-05-21 ENCOUNTER — OFFICE VISIT (OUTPATIENT)
Facility: LOCATION | Age: 34
End: 2024-05-21

## 2024-05-21 VITALS
HEART RATE: 117 BPM | WEIGHT: 160 LBS | OXYGEN SATURATION: 98 % | BODY MASS INDEX: 25 KG/M2 | SYSTOLIC BLOOD PRESSURE: 136 MMHG | DIASTOLIC BLOOD PRESSURE: 80 MMHG

## 2024-05-21 DIAGNOSIS — E83.52 HYPERCALCEMIA: Primary | ICD-10-CM

## 2024-05-21 DIAGNOSIS — E10.65 UNCONTROLLED TYPE 1 DIABETES MELLITUS WITH HYPERGLYCEMIA (HCC): ICD-10-CM

## 2024-05-21 DIAGNOSIS — M81.8 OTHER OSTEOPOROSIS WITHOUT CURRENT PATHOLOGICAL FRACTURE: ICD-10-CM

## 2024-05-21 DIAGNOSIS — S22.068S OTHER CLOSED FRACTURE OF EIGHTH THORACIC VERTEBRA, SEQUELA: ICD-10-CM

## 2024-05-21 DIAGNOSIS — I10 ESSENTIAL HYPERTENSION: ICD-10-CM

## 2024-05-21 DIAGNOSIS — E83.52 HYPERCALCEMIA: ICD-10-CM

## 2024-05-21 DIAGNOSIS — E10.649 UNCONTROLLED TYPE 1 DIABETES MELLITUS WITH HYPOGLYCEMIA WITHOUT COMA (HCC): ICD-10-CM

## 2024-05-21 LAB
ANION GAP SERPL CALC-SCNC: 7 MMOL/L (ref 0–18)
BUN BLD-MCNC: 11 MG/DL (ref 9–23)
BUN/CREAT SERPL: 13.6 (ref 10–20)
CALCIUM BLD-MCNC: 10.1 MG/DL (ref 8.7–10.4)
CHLORIDE SERPL-SCNC: 106 MMOL/L (ref 98–112)
CO2 SERPL-SCNC: 28 MMOL/L (ref 21–32)
CREAT BLD-MCNC: 0.81 MG/DL
EGFRCR SERPLBLD CKD-EPI 2021: 119 ML/MIN/1.73M2 (ref 60–?)
FASTING STATUS PATIENT QL REPORTED: NO
GLUCOSE BLD-MCNC: 189 MG/DL (ref 70–99)
OSMOLALITY SERPL CALC.SUM OF ELEC: 296 MOSM/KG (ref 275–295)
POTASSIUM SERPL-SCNC: 4.3 MMOL/L (ref 3.5–5.1)
SODIUM SERPL-SCNC: 141 MMOL/L (ref 136–145)

## 2024-05-21 PROCEDURE — 36415 COLL VENOUS BLD VENIPUNCTURE: CPT

## 2024-05-21 PROCEDURE — 80048 BASIC METABOLIC PNL TOTAL CA: CPT

## 2024-05-21 PROCEDURE — 82542 COL CHROMOTOGRAPHY QUAL/QUAN: CPT

## 2024-05-21 PROCEDURE — 99214 OFFICE O/P EST MOD 30 MIN: CPT | Performed by: INTERNAL MEDICINE

## 2024-05-21 PROCEDURE — 82652 VIT D 1 25-DIHYDROXY: CPT

## 2024-05-21 RX ORDER — INSULIN LISPRO 100 [IU]/ML
INJECTION, SOLUTION INTRAVENOUS; SUBCUTANEOUS
Qty: 90 ML | Refills: 1 | Status: SHIPPED | OUTPATIENT
Start: 2024-05-21

## 2024-05-21 NOTE — PATIENT INSTRUCTIONS
Labs showed normal-high Ca, high calcium in the urine. PTH is low so it is not PTH mediated. We need to look for other etiology for high serum and urine calcium   Cortisol and thyroid levels are normal   Vit D is normal   SPEP normal     Plan      Labs today   CT chest   We might need pulmonary consult   RTC in 1 mo

## 2024-05-21 NOTE — PROGRESS NOTES
Reason for Visit:  t1DM.    Requesting Physician:   ..Maureen Guerra MD    CHIEF COMPLAINT:  t1DM on pump     HISTORY OF PRESENT ILLNESS:   Vasiliy Adams is a 33 year old male who presents with t1DM and osteoporosis     Was seeing Endo at  Dr [place]    10/2023 T8 fracture from trampoline   DXA was done 2024 showed osteoporosis   Saw spine surgeon  Had labs and here to discuss  result     Labs showed normal-high Ca, high calcium in the urine. PTH is low so it is not PTH mediated. We need to look for other etiology for high serum and urine calcium   Cortisol and thyroid levels are normal   Vit D is normal   SPEP normal                t1DM dx when 12   DKA x2. Last in   Has been on pump since . Omnipod, medtronic and now on pump 770g ~ 2018       Has ketone strips      He was missing lunch or on the go so misses bolus for it. Now he is doing better. We reviewed CGM and there is improvement in his TIR by 6-7%        Bolus   ICR 6.3           insulin carb ratio   ISF 3334             insulin sensitivity factor   AIT 4.             active insulin time   Target     Basal 36.8 unit/day  MN 1.3  3AM  1.4  7AM 1.8  330PM 1.55        A1c 9.6, tsh, lipid, CMp  Neg alb/creat      SSx   Works for [employer],   Travels for work      3 yo daughter   Vaping   No etoh now - 8 mo stopped   No marijuana   No drugs       T8 Vertebrae fracture - saw spine specialist for that. 2024   Takes vit D and Ca daily now         DM quality measures:  A1C/Blood pressure: as reported above   Nephropathy screenin2023 neg LIPID screenin2023  Last dilated eye exam: 2022  data recorded  Last diabetic foot exam: 2023  Dentist : recommend every 6m      ASSESSMENT AND PLAN:  33 year old uncontrolled t1DM on pump, hypo and hyperglycemia, osteoporosis with T8 fracture non-PTH mediated hypercalcemia, and a smoker   CGM data were reviewed   UTD on foot exam and needs eye exam now     Labs showed normal-high  Ca, high calcium in the urine. PTH is low so it is not PTH mediated. We need to look for other etiology for high serum and urine calcium   Cortisol and thyroid levels are normal   Vit D is normal   SPEP normal     Plan      Labs today   CT chest   We might need pulmonary consult   RTC in 1 mo        Take vitamin D3 2000 international unit a day and calcium 600 mg twice a day or 3 servings of dairy a day   Do weight bearing exercise   Follow fall precautions       . 14 day CGM data showed   GMI  8.0 %  TIR  47 %  high 28 %  low %  very high 23 %  Very low   %    pump settings   Bolus   ICR 6.3             insulin carb ratio   ISF 34 - 33       insulin sensitivity factor   AIT 4         active insulin time   Target 130    Basal 37.8 unit/day  MN 1.3  3AM  1.4  7AM 1.8  330PM 1.55      PAST MEDICAL HISTORY:   T1dm  T8 Vertebrae fracture - saw spine specialist for that. Jan 2024   Etoh abuse history in recovery now  RENU  Social anxiety   ADD   Osteoporosis     PAST SURGICAL HISTORY:   Appy as a child   Hernia   2 moles removed       CURRENT MEDICATIONS:    Current Outpatient Medications   Medication Sig Dispense Refill    insulin lispro (HUMALOG) 100 UNIT/ML Injection Solution Inject up to 100 units via insulin pump daily as directed. 90 mL 1    ADDERALL XR 30 MG Oral Capsule SR 24 Hr       amphetamine-dextroamphetamine 10 MG Oral Tab       sertraline 100 MG Oral Tab       Continuous Blood Gluc Sensor (DEXCOM G7 SENSOR) Does not apply Misc Use as directed every 10 days 9 each 1    Urine Glucose-Ketones Test In Vitro Strip Use if BG > 250 50 strip 0    Glucose Blood (BLOOD GLUCOSE TEST STRIPS 333) In Vitro Strip 1 each by In Vitro route 3 (three) times daily. 100 strip 11    insulin aspart 100 Units/mL Injection Solution INJECT UP TO 70 UNITS UNDER THE SKIN DAILY VIA DigifeyeTRONIC 670G INSULIN PUMP. OK to change to Humalog if Novolog not covered      clotrimazole 1 % External Cream Apply 1 Application topically 2 (two) times  daily. (Patient not taking: Reported on 5/7/2024) 12 g 0    insulin glargine (LANTUS SOLOSTAR) 100 UNIT/ML Subcutaneous Solution Pen-injector Inject 40 Units into the skin nightly. 36 mL 0    Continuous Blood Gluc Sensor (DEXCOM G6 SENSOR) Does not apply Misc 1 each Every 10 days. Use as directed every 10 days (Patient not taking: Reported on 5/7/2024) 3 each 11    Continuous Blood Gluc Transmit (DEXCOM G6 TRANSMITTER) Does not apply Misc 1 each every 3 (three) months. (Patient not taking: Reported on 5/6/2024) 1 each 1    lisdexamfetamine 30 MG Oral Cap  (Patient not taking: Reported on 5/6/2024)      sertraline 50 MG Oral Tab  (Patient not taking: Reported on 5/7/2024)         ALLERGIES:  No Known Allergies    SOCIAL HISTORY:    Social History     Socioeconomic History    Marital status:    Tobacco Use    Smoking status: Never    Smokeless tobacco: Never   Vaping Use    Vaping status: Never Used   Substance and Sexual Activity    Alcohol use: Not Currently    Drug use: Not Currently       FAMILY HISTORY:     T1dm in PGF  Cousin with t1DM had HD and passed a way from complications   F prostate ca in remission now  P uncle cancer ?  MGF lung ca  M osteoporosis, recovery from etoh, adult ADD  Brother is in recovery from etoh         PHYSICAL EXAM:   Height: --  Weight: 160 lb (72.6 kg) (05/21 1151)  BSA (Calculated - sq m): --  Pulse: 117 (05/21 1151)  BP: 136/80 (05/21 1151)  Temp: --  Do Not Use - Resp Rate: --  SpO2: 98 % (05/21 1151)         CONSTITUTIONAL:  Awake and alert. Age appropriate, good hygiene not in acute distress. Well nourished and well developed. no acute distress   PSYCH:   Orientated to time, place, person & situation, Normal mood and affect, memory intact, normal insight and judgment, cooperative  Neuro: speech is clear. Awake, alert, no aphasia, no facial asymmetry, no nuchal rigidity  EYES:  No proptosis, no ptosis, conjunctiva normal       DATA:     Pertinent data reviewed    Orders  Placed This Encounter   Procedures    Parathyroid Hormone-related Peptide (PTH-rP) ([facility])    Basic Metabolic Panel (8)    Vitamin D, 1,25 Dihydroxy [Q]        DXA 5/2024   Findings in the left femoral neck and total left hip suggest osteopenia, there may be increased fracture risk.  The 10 year fracture risk for major osteoporotic fracture is 8.3%, and for hip fracture is 4.6%.   2. Findings in the total AP lumbar spine suggest osteoporosis, and there is increased fracture risk    Latest Reference Range & Units 05/03/24 08:38 05/06/24 10:12   Glucose 70 - 99 mg/dL  89   HEMOGLOBIN A1c <5.7 % 7.3 (H)    ESTIMATED AVERAGE GLUCOSE 68 - 126 mg/dL 163 (H)    Sodium 136 - 145 mmol/L  144   Potassium 3.5 - 5.1 mmol/L  4.5   Chloride 98 - 112 mmol/L  109   Carbon Dioxide, Total 21.0 - 32.0 mmol/L  28.0   BUN 9 - 23 mg/dL  12   CREATININE 0.70 - 1.30 mg/dL  0.80   CALCIUM 8.7 - 10.4 mg/dL  10.5 (H)   BUN/CREATININE RATIO 10.0 - 20.0   15.0   EGFR >=60 mL/min/1.73m2  119   ANION GAP 0 - 18 mmol/L  7   CALCULATED OSMOLALITY 275 - 295 mOsm/kg  297 (H)   ALKALINE PHOSPHATASE 45 - 117 U/L  57   AST (SGOT) <=34 U/L  20   ALT (SGPT) 10 - 49 U/L  14   Total Bilirubin 0.3 - 1.2 mg/dL  0.6   Globulin 2.0 - 3.5 g/dL  2.9   PHOSPHORUS 2.4 - 5.1 mg/dL  4.0      Latest Reference Range & Units 05/06/24 10:12   A/G Ratio 1.0 - 2.0  1.7   PROTEIN, TOTAL 5.7 - 8.2 g/dL 7.8   Albumin 3.2 - 4.8 g/dL 4.9 (H)   VITAMIN D, 25-OH, TOTAL 30.0 - 100.0 ng/mL 45.1          Latest Reference Range & Units 11/20/23 12:44   TSH 0.550 - 4.780 mIU/mL 1.234      Latest Reference Range & Units 11/20/23 12:44 05/06/24 10:12   CALCIUM URINE RANDOM 0.9 - 37.9 mg/dL  17.5   CREATININE UR RANDOM mg/dL 50.20    MALB URINE mg/dL <0.30    MALB/CRE CALC  See chart for results                No results for input(s): \"TSH\", \"T4F\", \"T3F\", \"THYP\" in the last 72 hours.  No results found.    Orders Placed This Encounter   Procedures    Parathyroid  Hormone-related Peptide (PTH-rP) ([facility])    Basic Metabolic Panel (8)    Vitamin D, 1,25 Dihydroxy [Q]     Orders Placed This Encounter    Parathyroid Hormone-related Peptide (PTH-rP) ([facility])     Standing Status:   Future     Number of Occurrences:   1     Standing Expiration Date:   5/21/2025     Order Specific Question:   Release to patient     Answer:   Immediate    Basic Metabolic Panel (8)     Standing Status:   Future     Number of Occurrences:   1     Standing Expiration Date:   5/21/2025     Order Specific Question:   Release to patient     Answer:   Immediate    Vitamin D, 1,25 Dihydroxy [Q]     Standing Status:   Future     Number of Occurrences:   1     Standing Expiration Date:   5/21/2025     Order Specific Question:   Release to patient     Answer:   Immediate    insulin lispro (HUMALOG) 100 UNIT/ML Injection Solution     Sig: Inject up to 100 units via insulin pump daily as directed.     Dispense:  90 mL     Refill:  1          This is a specialized patient consultation in endocrinology and required comprehensive review of prior records, as well as current evaluation, with time required for consideration of complex endocrine issues and consultation. For this visit, I personally interviewed the patient, and family member if accompanied, performed the pertinent parts of the history and physical examination. ROS included screening for appropriate endocrine conditions.   Today's diagnosis and plan were reviewed in detail with the patient who states understanding and agrees with plan. I discussed with the patient possible diagnosis, differential diagnosis, need for work up, treatment options, alternatives and side effects.     Please see note for details about time spent which includes:   · pre-visit preparation  · reviewing records  · face to face time with the patient   · timely documentation of the encounter  · ordering medications/tests  · communication with care team  · care  coordination    I appreciate the opportunity to be part of your patient's medical care and will keep you, as the referring and primary physicians, informed about the care of your patient. Please feel free to contact me should you have any questions.    The 21st Century Cures Act makes medical notes like these available to patients in the interest of transparency. Please be advised this is a medical document. Medical documents are intended to carry relevant information, facts as evident, and the clinical opinion of the practitioner. The medical note is intended as peer to peer communication and may appear blunt or direct. It is written in medical language and may contain abbreviations or verbiage that are unfamiliar.   Juan Pablo Palmer MD

## 2024-05-24 LAB — VIT D 1,25 DI-OH: 77.2 PG/ML

## 2024-05-26 LAB — PTH-RELATED PEPTIDE: <2 PMOL/L

## 2024-05-28 ENCOUNTER — HOSPITAL ENCOUNTER (OUTPATIENT)
Dept: CT IMAGING | Facility: HOSPITAL | Age: 34
Discharge: HOME | End: 2024-05-28
Attending: INTERNAL MEDICINE

## 2024-05-28 DIAGNOSIS — E83.52 HYPERCALCEMIA: ICD-10-CM

## 2024-05-28 PROCEDURE — 74177 CT ABD & PELVIS W/CONTRAST: CPT | Performed by: INTERNAL MEDICINE

## 2024-05-28 PROCEDURE — 71260 CT THORAX DX C+: CPT | Performed by: INTERNAL MEDICINE

## 2024-05-29 ENCOUNTER — TELEPHONE (OUTPATIENT)
Dept: ENDOCRINOLOGY CLINIC | Facility: CLINIC | Age: 34
End: 2024-05-29

## 2024-05-29 DIAGNOSIS — D71 GRANULOMATOUS DISEASE (HCC): Primary | ICD-10-CM

## 2024-05-29 DIAGNOSIS — E83.52 HYPERCALCEMIA: ICD-10-CM

## 2024-05-29 NOTE — TELEPHONE ENCOUNTER
Patient returning MD;s call.  Patient states that he was informed to see Pulmonary.  Patient scheduled first available appointment with Dr Chang, which is 9/13/24.  First available for the other Pulmonologist would be same day or a later date.   See previous 5/29/24 signed telephone encounter.

## 2024-05-29 NOTE — TELEPHONE ENCOUNTER
Please send  referral to pulmonary and pt got a letter from insurance to get approval for the CT scan.   Thanks      I called the pt with CT scan result   CT showed Calcified mediastinal lymph nodes suggestive of remote granulomatous disease.   Otherwise, no primary malignancy or metastatic disease seen within the chest, abdomen or pelvis

## 2024-06-27 ENCOUNTER — LAB ENCOUNTER (OUTPATIENT)
Dept: LAB | Age: 34
End: 2024-06-27
Attending: INTERNAL MEDICINE

## 2024-06-27 ENCOUNTER — OFFICE VISIT (OUTPATIENT)
Facility: LOCATION | Age: 34
End: 2024-06-27

## 2024-06-27 VITALS
HEIGHT: 67 IN | OXYGEN SATURATION: 99 % | BODY MASS INDEX: 25.17 KG/M2 | HEART RATE: 94 BPM | DIASTOLIC BLOOD PRESSURE: 64 MMHG | WEIGHT: 160.38 LBS | SYSTOLIC BLOOD PRESSURE: 128 MMHG

## 2024-06-27 DIAGNOSIS — D71 GRANULOMATOUS DISEASE (HCC): ICD-10-CM

## 2024-06-27 DIAGNOSIS — E83.52 HYPERCALCEMIA: ICD-10-CM

## 2024-06-27 DIAGNOSIS — S22.068S OTHER CLOSED FRACTURE OF EIGHTH THORACIC VERTEBRA, SEQUELA: ICD-10-CM

## 2024-06-27 DIAGNOSIS — I10 ESSENTIAL HYPERTENSION: ICD-10-CM

## 2024-06-27 DIAGNOSIS — E10.65 UNCONTROLLED TYPE 1 DIABETES MELLITUS WITH HYPERGLYCEMIA (HCC): Primary | ICD-10-CM

## 2024-06-27 DIAGNOSIS — E10.649 UNCONTROLLED TYPE 1 DIABETES MELLITUS WITH HYPOGLYCEMIA WITHOUT COMA (HCC): ICD-10-CM

## 2024-06-27 DIAGNOSIS — E83.52 HIGH CALCIUM LEVELS: ICD-10-CM

## 2024-06-27 DIAGNOSIS — M81.8 OTHER OSTEOPOROSIS WITHOUT CURRENT PATHOLOGICAL FRACTURE: ICD-10-CM

## 2024-06-27 DIAGNOSIS — R79.89 LOW VITAMIN D LEVEL: ICD-10-CM

## 2024-06-27 LAB
FOLATE SERPL-MCNC: 19 NG/ML (ref 8.7–?)
VIT B12 SERPL-MCNC: 627 PG/ML (ref 211–911)

## 2024-06-27 PROCEDURE — 99214 OFFICE O/P EST MOD 30 MIN: CPT | Performed by: INTERNAL MEDICINE

## 2024-06-27 PROCEDURE — 82607 VITAMIN B-12: CPT | Performed by: INTERNAL MEDICINE

## 2024-06-27 PROCEDURE — 82746 ASSAY OF FOLIC ACID SERUM: CPT | Performed by: INTERNAL MEDICINE

## 2024-06-27 PROCEDURE — 36415 COLL VENOUS BLD VENIPUNCTURE: CPT | Performed by: INTERNAL MEDICINE

## 2024-06-27 NOTE — PATIENT INSTRUCTIONS
Labs today   RTC in Aug   Bolus on time     Take vitamin D3 2000 international unit a day and calcium 600 mg twice a day or 3 servings of dairy a day   Do weight bearing exercise   Follow fall precautions          pump settings   Bolus   ICR 6.3             insulin carb ratio   ISF 34 - 33       insulin sensitivity factor   AIT 4         active insulin time   Target 130    Basal 37.775 unit/day  MN 1.3  3AM  1.4  7AM 1.8  330PM 1.55

## 2024-06-27 NOTE — PROGRESS NOTES
Reason for Visit:  t1DM.    Requesting Physician:   ..Maureen Guerar MD    CHIEF COMPLAINT:  t1DM on pump     HISTORY OF PRESENT ILLNESS:   Vasiliy Adams is a 33 year old uncontrolled t1DM on pump, hypo and hyperglycemia, osteoporosis with T8 fracture non-PTH mediated hypercalcemia, and a smoker     Will see pulmonary in Sept   BG is better but still taking doses later due to work schedule   Eating dinner late now   Eating on the road more now and traveling more      10/2023 T8 fracture from trampoline   DXA was done 2024 showed osteoporosis   Saw spine surgeon     Labs showed normal-high Ca, high calcium in the urine. PTH is low so it is not PTH mediated. We need to look for other etiology for high serum and urine calcium   Cortisol and thyroid levels are normal   Vit D is normal   SPEP normal   CT showed Calcified mediastinal lymph nodes suggestive of remote granulomatous disease.   Otherwise, no primary malignancy or metastatic disease seen within the chest, abdomen or pelvis  I ref' to pulm     t1DM dx when 12   DKA x2. Last in   Has been on pump since . Omnipod, medtronic and now on pump 770g ~ 2018    Has ketone strips           Bolus   ICR 6.3           insulin carb ratio   ISF 3334             insulin sensitivity factor   AIT 4.             active insulin time   Target     Basal 36.8 unit/day  MN 1.3  3AM  1.4  7AM 1.8  330PM 1.55        A1c 9.6, tsh, lipid, CMp  Neg alb/creat      SSx   Works for [employer],   Travels for work      3 yo daughter   Vaping   No etoh now - 8 mo stopped   No marijuana   No drugs       T8 Vertebrae fracture - saw spine specialist for that. 2024   Takes vit D and Ca daily now         DM quality measures:  A1C/Blood pressure: as reported above   Nephropathy screenin2023 neg LIPID screenin2023  Last dilated eye exam: 2023  data recorded  Last diabetic foot exam: 2024    Dentist : recommend every 6m    . 14 day CGM data showed    GMI 7.8  %  TIR 53 %  high 29%  low %  very high 17%  Very low   %    ASSESSMENT AND PLAN:  33 year old uncontrolled t1DM on pump, hypo and hyperglycemia, osteoporosis with T8 fracture non-PTH mediated hypercalcemia, and a smoker   D/w pt his CGM/pump data   UTD on foot exam and needs eye exam now   Getting low BG after correcting/bolusing. We discussed today how to avoid   D/w cheat sheet also    Labs showed normal-high Ca, high calcium in the urine. PTH is low so it is not PTH mediated. We need to look for other etiology for high serum and urine calcium   Cortisol and thyroid levels are normal   Vit D is normal   SPEP normal   CT showed Calcified mediastinal lymph nodes suggestive of remote granulomatous disease.   Otherwise, no primary malignancy or metastatic disease seen within the chest, abdomen or pelvis  I ref' to pulm     C/o tinging in finger tip and stiffness     Plan    Labs today b12 and folate   Target A1c 6.5     RTC in Aug   Bolus on time     Take vitamin D3 2000 international unit a day and calcium 600 mg twice a day or 3 servings of dairy a day   Do weight bearing exercise   Follow fall precautions     pump settings   Bolus   ICR 6.3             insulin carb ratio   ISF 34 - 33       insulin sensitivity factor   AIT 4         active insulin time   Target 130    Basal 37.775 unit/day  MN 1.3  3AM  1.4  7AM 1.8  330PM 1.55          PAST MEDICAL HISTORY:   T1dm  T8 Vertebrae fracture - saw spine specialist for that. Jan 2024   Etoh abuse history in recovery now  RENU  Social anxiety   ADD   Osteoporosis     PAST SURGICAL HISTORY:   Appy as a child   Hernia   2 moles removed       CURRENT MEDICATIONS:    Current Outpatient Medications   Medication Sig Dispense Refill    ADDERALL XR 30 MG Oral Capsule SR 24 Hr       amphetamine-dextroamphetamine 10 MG Oral Tab       sertraline 100 MG Oral Tab       Continuous Blood Gluc Sensor (DEXCOM G7 SENSOR) Does not apply Misc Use as directed every 10 days 9 each  1    insulin aspart 100 Units/mL Injection Solution INJECT UP TO 70 UNITS UNDER THE SKIN DAILY VIA MEDTRONIC 670G INSULIN PUMP. OK to change to Humalog if Novolog not covered      insulin lispro (HUMALOG) 100 UNIT/ML Injection Solution Inject up to 100 units via insulin pump daily as directed. (Patient not taking: Reported on 6/27/2024) 90 mL 1    insulin glargine (LANTUS SOLOSTAR) 100 UNIT/ML Subcutaneous Solution Pen-injector Inject 40 Units into the skin nightly. 36 mL 0    Urine Glucose-Ketones Test In Vitro Strip Use if BG > 250 50 strip 0    Glucose Blood (BLOOD GLUCOSE TEST STRIPS 333) In Vitro Strip 1 each by In Vitro route 3 (three) times daily. 100 strip 11    Continuous Blood Gluc Sensor (DEXCOM G6 SENSOR) Does not apply Misc 1 each Every 10 days. Use as directed every 10 days (Patient not taking: Reported on 5/7/2024) 3 each 11    lisdexamfetamine 30 MG Oral Cap  (Patient not taking: Reported on 5/6/2024)      sertraline 50 MG Oral Tab  (Patient not taking: Reported on 5/7/2024)         ALLERGIES:  No Known Allergies    SOCIAL HISTORY:    Social History     Socioeconomic History    Marital status:    Tobacco Use    Smoking status: Never    Smokeless tobacco: Never   Vaping Use    Vaping status: Never Used   Substance and Sexual Activity    Alcohol use: Not Currently    Drug use: Not Currently       FAMILY HISTORY:     T1dm in PGF  Cousin with t1DM had HD and passed a way from complications   F prostate ca in remission now  P uncle cancer ?  MGF lung ca  M osteoporosis, recovery from etoh, adult ADD  Brother is in recovery from etoh         PHYSICAL EXAM:   Height: 5' 7\" (170.2 cm) (06/27 0819)  Weight: 160 lb 6.4 oz (72.8 kg) (06/27 0819)  BSA (Calculated - sq m): 1.84 sq meters (06/27 0819)  Pulse: 94 (06/27 0819)  BP: 128/64 (06/27 0819)  Temp: --  Do Not Use - Resp Rate: --  SpO2: 99 % (06/27 0819)         CONSTITUTIONAL:  Awake and alert. Age appropriate, good hygiene not in acute distress.  Well nourished and well developed. no acute distress   PSYCH:   Orientated to time, place, person & situation, Normal mood and affect, memory intact, normal insight and judgment, cooperative  Neuro: speech is clear. Awake, alert, no aphasia, no facial asymmetry, no nuchal rigidity  EYES:  No proptosis, no ptosis, conjunctiva normal       DATA:     Pertinent data reviewed    Orders Placed This Encounter   Procedures    Vitamin B12    Folic Acid Serum (Folate)      05/28/24 11:36   CT CHEST+ABDOMEN+PELVIS(ALL [facility]TRST ONLY)(CPT=71260/03086) Calcified mediastinal lymph nodes suggestive of remote granulomatous disease.      Otherwise, no primary malignancy or metastatic disease seen within the chest, abdomen or pelvis    Rpt: View report in Results Review for more information   DXA 5/2024   Findings in the left femoral neck and total left hip suggest osteopenia, there may be increased fracture risk.  The 10 year fracture risk for major osteoporotic fracture is 8.3%, and for hip fracture is 4.6%.   2. Findings in the total AP lumbar spine suggest osteoporosis, and there is increased fracture risk    Latest Reference Range & Units 05/03/24 08:38 05/06/24 10:12   Glucose 70 - 99 mg/dL  89   HEMOGLOBIN A1c <5.7 % 7.3 (H)    ESTIMATED AVERAGE GLUCOSE 68 - 126 mg/dL 163 (H)    Sodium 136 - 145 mmol/L  144   Potassium 3.5 - 5.1 mmol/L  4.5   Chloride 98 - 112 mmol/L  109   Carbon Dioxide, Total 21.0 - 32.0 mmol/L  28.0   BUN 9 - 23 mg/dL  12   CREATININE 0.70 - 1.30 mg/dL  0.80   CALCIUM 8.7 - 10.4 mg/dL  10.5 (H)   BUN/CREATININE RATIO 10.0 - 20.0   15.0   EGFR >=60 mL/min/1.73m2  119   ANION GAP 0 - 18 mmol/L  7   CALCULATED OSMOLALITY 275 - 295 mOsm/kg  297 (H)   ALKALINE PHOSPHATASE 45 - 117 U/L  57   AST (SGOT) <=34 U/L  20   ALT (SGPT) 10 - 49 U/L  14   Total Bilirubin 0.3 - 1.2 mg/dL  0.6   Globulin 2.0 - 3.5 g/dL  2.9   PHOSPHORUS 2.4 - 5.1 mg/dL  4.0      Latest Reference Range & Units 05/06/24 10:12   A/G Ratio 1.0 -  2.0  1.7   PROTEIN, TOTAL 5.7 - 8.2 g/dL 7.8   Albumin 3.2 - 4.8 g/dL 4.9 (H)   VITAMIN D, 25-OH, TOTAL 30.0 - 100.0 ng/mL 45.1          Latest Reference Range & Units 11/20/23 12:44   TSH 0.550 - 4.780 mIU/mL 1.234      Latest Reference Range & Units 11/20/23 12:44 05/06/24 10:12   CALCIUM URINE RANDOM 0.9 - 37.9 mg/dL  17.5   CREATININE UR RANDOM mg/dL 50.20    MALB URINE mg/dL <0.30    MALB/CRE CALC  See chart for results                No results for input(s): \"TSH\", \"T4F\", \"T3F\", \"THYP\" in the last 72 hours.  No results found.    Orders Placed This Encounter   Procedures    Vitamin B12    Folic Acid Serum (Folate)     Orders Placed This Encounter    Vitamin B12    Folic Acid Serum (Folate)     Order Specific Question:   Release to patient     Answer:   Immediate          This is a specialized patient consultation in endocrinology and required comprehensive review of prior records, as well as current evaluation, with time required for consideration of complex endocrine issues and consultation. For this visit, I personally interviewed the patient, and family member if accompanied, performed the pertinent parts of the history and physical examination. ROS included screening for appropriate endocrine conditions.   Today's diagnosis and plan were reviewed in detail with the patient who states understanding and agrees with plan. I discussed with the patient possible diagnosis, differential diagnosis, need for work up, treatment options, alternatives and side effects.     Please see note for details about time spent which includes:   · pre-visit preparation  · reviewing records  · face to face time with the patient   · timely documentation of the encounter  · ordering medications/tests  · communication with care team  · care coordination    I appreciate the opportunity to be part of your patient's medical care and will keep you, as the referring and primary physicians, informed about the care of your patient. Please  feel free to contact me should you have any questions.    The 21st Century Cures Act makes medical notes like these available to patients in the interest of transparency. Please be advised this is a medical document. Medical documents are intended to carry relevant information, facts as evident, and the clinical opinion of the practitioner. The medical note is intended as peer to peer communication and may appear blunt or direct. It is written in medical language and may contain abbreviations or verbiage that are unfamiliar.   Juan Pablo Palmer MD

## 2024-09-13 NOTE — H&P
Referring Physician  Maureen Guerra MD    Chief Complaint  Calcified mediastinal lymph node    History of Present Illness  Patient is a 33-year-old male who presents to pulmonary clinic for initial visit.  Had incidental finding of calcified mediastinal lymph node on recent CT chest.  Denies history of known lung disease.  No significant dyspnea symptoms, cough.  No known history of sarcoidosis.  States he had URI-like presentation but not aware of any history of severe pneumonia.    Review of Systems  Constitutional: denies weight loss, fevers, chills, weakness, fatigue, recent illness  HEENT: denies epistaxis, sore throat, postnasal drip  Cardio: denies chest pain, chest pressure, palpitations  Respiratory: denies dyspnea, cough, wheezing, hemoptysis   GI: denies nausea, vomiting, abdominal pain  : denies dysuria, hematuria  Musculoskeletal: joint pain  Integumentary: denies rash, itching  Neurological: denies syncope, weakness, dizziness,   Psychiatric: denies depression, anxiety  Hematologic: denies bruising    Past Medical History  Past Medical History:    Type 1 diabetes mellitus (HCC)        Surgical History  No past surgical history on file.    Family History  No family history on file.     Social History  Tobacco: 8-pack-year history of tobacco abuse quit approximately 2 years ago  Alcohol: Social  Illicit Drugs: Denies    Medications  Current Outpatient Medications on File Prior to Visit   Medication Sig Dispense Refill    insulin lispro (HUMALOG) 100 UNIT/ML Injection Solution Inject up to 100 units via insulin pump daily as directed. (Patient not taking: Reported on 6/27/2024) 90 mL 1    ADDERALL XR 30 MG Oral Capsule SR 24 Hr       amphetamine-dextroamphetamine 10 MG Oral Tab       sertraline 100 MG Oral Tab       Continuous Blood Gluc Sensor (DEXCOM G7 SENSOR) Does not apply Misc Use as directed every 10 days 9 each 1    insulin glargine (LANTUS SOLOSTAR) 100 UNIT/ML Subcutaneous Solution Pen-injector  Inject 40 Units into the skin nightly. 36 mL 0    Urine Glucose-Ketones Test In Vitro Strip Use if BG > 250 50 strip 0    Glucose Blood (BLOOD GLUCOSE TEST STRIPS 333) In Vitro Strip 1 each by In Vitro route 3 (three) times daily. 100 strip 11    Continuous Blood Gluc Sensor (DEXCOM G6 SENSOR) Does not apply Misc 1 each Every 10 days. Use as directed every 10 days (Patient not taking: Reported on 5/7/2024) 3 each 11    lisdexamfetamine 30 MG Oral Cap  (Patient not taking: Reported on 5/6/2024)      insulin aspart 100 Units/mL Injection Solution INJECT UP TO 70 UNITS UNDER THE SKIN DAILY VIA [employer] 670G INSULIN PUMP. OK to change to Humalog if Novolog not covered      sertraline 50 MG Oral Tab  (Patient not taking: Reported on 5/7/2024)       Current Facility-Administered Medications on File Prior to Visit   Medication Dose Route Frequency Provider Last Rate Last Admin    Glucagon (rDNA) 1 MG injection 1 mg  1 mg Subcutaneous Once Juan Pablo Palmer MD           Allergies  No Known Allergies    Physical Exam  Constitutional: no acute distress  HEENT: PERRL  Neck: supple, no JVD  Cardio: RRR, S1 S2  Respiratory: clear to auscultation bilaterally, no wheezing, rales, rhonchi, crackles  GI: abdomen soft, non tender, active bowel sounds, no organomegaly  Extremities: no clubbing, cyanosis, edema  Neurologic: no gross motor deficits  Skin: warm, dry  Lymphatic: no supraclavicular lymphadenopathy     Imaging  CT chest performed on 5/28/2024 calcified mediastinal lymph node suggestive of remote granulomatous disease.  No significant abnormality seen within the chest abdomen pelvis noted.  Significant lymphadenopathy in terms of size criteria    Pulmonary Function Testing  None available to review    Assessment  1.  Calcified mediastinal lymph nodes  2.  Prior nicotine dependence    Plan  -Patient presents today for pulmonary evaluation.  I reviewed his CT chest findings from 5/28/2024 with calcified mediastinal lymph node  suggestive of prior granulomatous process.  No significant lymphadenopathy seen within the chest.  No evidence of any parenchymal lung disease seen.  Explained to patient findings likely secondary to prior infectious process.  Sarcoidosis remains less likely.  Given no evidence of lymphadenopathy do not recommend subsequent chest imaging or CT chest.  No further follow-up necessary from pulmonary perspective unless signs of worsening dyspnea symptoms in the future    Mejia Chang DO  Pulmonary Critical Care Medicine  [facility]  9/13/2024  9:57 AM

## 2024-10-21 RX ORDER — ACYCLOVIR 400 MG/1
TABLET ORAL
Qty: 9 EACH | Refills: 1 | Status: SHIPPED | OUTPATIENT
Start: 2024-10-21

## 2024-10-21 NOTE — TELEPHONE ENCOUNTER
Endocrine Refill protocol for CGM supplies     Protocol Criteria:  PASSED Reason: N/A    If below requirement is met, send a 90-day supply with 1 refill per provider protocol.     Verify appointment with Endocrinology completed in the last 12 months or scheduled in the next 6 months     Last completed office visit:6/27/2024 Juan Pablo Palmer MD   Next scheduled Follow up:   Future Appointments   Date Time Provider Department Center   12/10/2024  4:00 PM [facility]

## 2024-12-16 DIAGNOSIS — I10 ESSENTIAL HYPERTENSION: ICD-10-CM

## 2024-12-16 DIAGNOSIS — E83.52 HYPERCALCEMIA: ICD-10-CM

## 2024-12-16 DIAGNOSIS — S22.068S OTHER CLOSED FRACTURE OF EIGHTH THORACIC VERTEBRA, SEQUELA: ICD-10-CM

## 2024-12-16 DIAGNOSIS — E10.649 UNCONTROLLED TYPE 1 DIABETES MELLITUS WITH HYPOGLYCEMIA WITHOUT COMA (HCC): ICD-10-CM

## 2024-12-16 DIAGNOSIS — M81.8 OTHER OSTEOPOROSIS WITHOUT CURRENT PATHOLOGICAL FRACTURE: ICD-10-CM

## 2024-12-16 DIAGNOSIS — E10.65 UNCONTROLLED TYPE 1 DIABETES MELLITUS WITH HYPERGLYCEMIA (HCC): ICD-10-CM

## 2024-12-16 RX ORDER — INSULIN LISPRO 100 [IU]/ML
INJECTION, SOLUTION INTRAVENOUS; SUBCUTANEOUS
Qty: 90 ML | Refills: 0 | Status: SHIPPED | OUTPATIENT
Start: 2024-12-16

## 2024-12-16 NOTE — TELEPHONE ENCOUNTER
Endocrine refill protocol for basal insulins     Protocol Criteria: FAILED Reason: N/A    If all below requirements are met, send a 90-day supply with 1 refill per provider protocol.       Verify Appointment with Endocrinology completed in the last 6 months or scheduled in the next 3 months.  Verify A1C has been completed within the last 6 months and is below 8.5%     Last completed office visit:6/27/2024 Juan Pablo Palmer MD   Next scheduled Follow up:   Future Appointments   Date Time Provider Department Center   2/6/2025  9:15 AM Juan Pablo Palmer MD [facility]      Last A1c result: Last A1c value was 7.3% done 5/3/2024.

## 2024-12-31 ENCOUNTER — MED REC SCAN ONLY (OUTPATIENT)
Dept: INTERNAL MEDICINE CLINIC | Facility: CLINIC | Age: 34
End: 2024-12-31

## 2024-12-31 ENCOUNTER — OFFICE VISIT (OUTPATIENT)
Dept: INTERNAL MEDICINE CLINIC | Facility: CLINIC | Age: 34
End: 2024-12-31

## 2024-12-31 VITALS
WEIGHT: 164 LBS | HEIGHT: 67 IN | SYSTOLIC BLOOD PRESSURE: 130 MMHG | BODY MASS INDEX: 25.74 KG/M2 | DIASTOLIC BLOOD PRESSURE: 80 MMHG | HEART RATE: 116 BPM

## 2024-12-31 DIAGNOSIS — Z13.0 SCREENING FOR DEFICIENCY ANEMIA: ICD-10-CM

## 2024-12-31 DIAGNOSIS — M25.50 MULTIPLE JOINT PAIN: ICD-10-CM

## 2024-12-31 DIAGNOSIS — Z00.00 WELLNESS EXAMINATION: Primary | ICD-10-CM

## 2024-12-31 DIAGNOSIS — E10.65 UNCONTROLLED TYPE 1 DIABETES MELLITUS WITH HYPERGLYCEMIA (HCC): ICD-10-CM

## 2024-12-31 DIAGNOSIS — Z13.29 THYROID DISORDER SCREEN: ICD-10-CM

## 2024-12-31 DIAGNOSIS — M81.0 OSTEOPOROSIS, UNSPECIFIED OSTEOPOROSIS TYPE, UNSPECIFIED PATHOLOGICAL FRACTURE PRESENCE: ICD-10-CM

## 2024-12-31 DIAGNOSIS — Z13.220 LIPID SCREENING: ICD-10-CM

## 2024-12-31 PROCEDURE — 99395 PREV VISIT EST AGE 18-39: CPT | Performed by: NURSE PRACTITIONER

## 2024-12-31 PROCEDURE — 99213 OFFICE O/P EST LOW 20 MIN: CPT | Performed by: NURSE PRACTITIONER

## 2024-12-31 RX ORDER — DEXTROAMPHETAMINE SACCHARATE, AMPHETAMINE ASPARTATE, DEXTROAMPHETAMINE SULFATE AND AMPHETAMINE SULFATE 7.5; 7.5; 7.5; 7.5 MG/1; MG/1; MG/1; MG/1
30 TABLET ORAL DAILY
COMMUNITY

## 2024-12-31 NOTE — PROGRESS NOTES
Vasiliy Adams is a 34 year old male.  HPI:   Pt here for annual exam.   C/o multiple joint pain (right knee, hands, in morning has trigger finger and intense pain, takes aleve). Reports occasional swelling. No redness.     He has a hx of Type 1 DM, under care of endocrinology    Hx of recent thoracic compression fracture, diagnosed with osteoporosis, under care of spinal surgeon and endocrinologist. Recently underwent testing for early onset osteoporosis in male. Hx of non PTH mediated hypercalcemia. Per review of chart, SPEP/Vit D/cortisol/thyroid were WNL. He is supplementing with vitamin D    Hx of calcific mediastinal lymph nodes suggestive of remote granulomatous disease, underwent evaluation by pulmonology. Hx of nicotine dependence in remission. Per pulmonology notes (MD Bernardo), likely prior infectious process, sarcoidosis remains less likely. He was not recommended subsequent CT chest imaging.   Current Outpatient Medications   Medication Sig Dispense Refill    amphetamine-dextroamphetamine (ADDERALL) 30 MG Oral Tab Take 1 tablet (30 mg total) by mouth daily.      CALCIUM OR Take by mouth. 1,000 mg in the morning and 1,000 mg in the afternoon      Multiple Vitamin (MULTIVITAMIN OR) Take 1 tablet by mouth daily.      Naproxen Sodium (ALEVE OR) Take by mouth.      HUMALOG 100 UNIT/ML Injection Solution INJECT UP  UNITS VIA INSULIN PUMP DAILY AS DIRECTED 90 mL 0    Continuous Glucose Sensor (DEXCOM G7 SENSOR) Does not apply Misc Use as directed every 10 days 9 each 1    amphetamine-dextroamphetamine 10 MG Oral Tab Take 1 tablet (10 mg total) by mouth as needed.      sertraline 100 MG Oral Tab Take 1 tablet (100 mg total) by mouth daily.      Continuous Blood Gluc Sensor (DEXCOM G6 SENSOR) Does not apply Misc 1 each Every 10 days. Use as directed every 10 days 3 each 11    insulin glargine (LANTUS SOLOSTAR) 100 UNIT/ML Subcutaneous Solution Pen-injector Inject 40 Units into the skin nightly. 36 mL 0     Urine Glucose-Ketones Test In Vitro Strip Use if BG > 250 50 strip 0    Glucose Blood (BLOOD GLUCOSE TEST STRIPS 333) In Vitro Strip 1 each by In Vitro route 3 (three) times daily. 100 strip 11      Past Medical History:    Osteoporosis    Type 1 diabetes mellitus (HCC)      Social History:  Social History     Socioeconomic History    Marital status:    Tobacco Use    Smoking status: Former     Current packs/day: 0.00     Types: Cigarettes     Quit date:      Years since quittin.0     Passive exposure: Past    Smokeless tobacco: Former     Quit date:    Vaping Use    Vaping status: Some Days    Substances: Nicotine    Devices: Disposable   Substance and Sexual Activity    Alcohol use: Not Currently    Drug use: Not Currently        REVIEW OF SYSTEMS:   Review of Systems   Constitutional:  Negative for activity change, appetite change, fatigue and unexpected weight change.   HENT:  Negative for congestion and dental problem.    Eyes:  Negative for discharge and visual disturbance.   Respiratory:  Negative for cough, chest tightness, shortness of breath and wheezing.    Cardiovascular:  Negative for chest pain, palpitations and leg swelling.   Gastrointestinal:  Negative for abdominal pain, constipation, diarrhea, nausea and vomiting.   Endocrine: Negative.    Genitourinary:  Negative for decreased urine volume, difficulty urinating and frequency.   Musculoskeletal:  Negative for arthralgias and back pain.   Skin:  Negative for color change, pallor and rash.   Neurological:  Negative for dizziness, seizures, numbness and headaches.   Psychiatric/Behavioral:  Negative for behavioral problems, dysphoric mood and suicidal ideas.           EXAM:   /80   Pulse 116   Ht 5' 7\" (1.702 m)   Wt 164 lb (74.4 kg)   BMI 25.69 kg/m²     Physical Exam  Vitals reviewed.   Constitutional:       Appearance: Normal appearance.   HENT:      Head: Normocephalic and atraumatic.      Right Ear: Tympanic  membrane, ear canal and external ear normal. There is no impacted cerumen.      Left Ear: Tympanic membrane, ear canal and external ear normal. There is no impacted cerumen.      Nose: Nose normal.      Mouth/Throat:      Mouth: Mucous membranes are moist.      Pharynx: Oropharynx is clear.   Eyes:      General: No scleral icterus.        Right eye: No discharge.         Left eye: No discharge.      Extraocular Movements: Extraocular movements intact.      Conjunctiva/sclera: Conjunctivae normal.      Pupils: Pupils are equal, round, and reactive to light.   Neck:      Thyroid: No thyroid mass or thyromegaly.   Cardiovascular:      Rate and Rhythm: Normal rate and regular rhythm.      Pulses: Normal pulses.      Heart sounds: Normal heart sounds.   Pulmonary:      Effort: Pulmonary effort is normal. No respiratory distress.      Breath sounds: Normal breath sounds.   Abdominal:      General: Abdomen is flat. Bowel sounds are normal. There is no distension.      Palpations: Abdomen is soft. There is no mass.      Tenderness: There is no abdominal tenderness.   Musculoskeletal:         General: Normal range of motion.      Cervical back: Normal range of motion and neck supple.      Right lower leg: No edema.      Left lower leg: No edema.   Lymphadenopathy:      Cervical: No cervical adenopathy.      Upper Body:      Right upper body: No supraclavicular adenopathy.      Left upper body: No supraclavicular adenopathy.   Skin:     General: Skin is warm and dry.      Capillary Refill: Capillary refill takes less than 2 seconds.      Coloration: Skin is not jaundiced.   Neurological:      General: No focal deficit present.      Mental Status: He is alert and oriented to person, place, and time.   Psychiatric:         Mood and Affect: Mood normal.         Judgment: Judgment normal.        Bilateral barefoot skin diabetic exam is normal, visualized feet and the appearance is normal.  Bilateral monofilament/sensation of both  feet is normal.  Pulsation pedal pulse exam of both lower legs/feet is normal as well.       ASSESSMENT AND PLAN:   1. Wellness examination  Education provided on healthy lifestyle.  Diet: reduce saturated fats, simple carbs and excess sugars. Hydrate. Leafy greens, legumes, nuts/seeds, healthy sources of Omega 3, lean proteins, complex carbs, berries.   Exercise 30 min daily cardio as tolerated.   Immunizations reviewed and recommendations provided  Preventative health screening recommendations reviewed.   Previous lab and imaging results reviewed.    - Comp Metabolic Panel (14)  - CBC With Differential With Platelet  - Lipid Panel  - TSH W Reflex To Free T4    2. Lipid screening  - Lipid Panel    3. Screening for deficiency anemia  - CBC With Differential With Platelet    4. Thyroid disorder screen  - TSH W Reflex To Free T4    5. Uncontrolled type 1 diabetes mellitus with hyperglycemia (HCC)  -CPM, under care of endo, due for eye exam, normal foot exam today. Labs per orders.   - Microalb/Creat Ratio, Random Urine  - Ophthalmology Referral - In Network    6. Osteoporosis, unspecified osteoporosis type, unspecified pathological fracture presence  -CPM, following with endocrinology.     7. Multiple joint pain  -inflammatory markers ordered  -Pt is scheduled with Rheumatology 4/2025  - Sed Guzman Baez (Automated) [E]; Future  - C-Reactive Protein [E]; Future  - Connective Tissue Disease (AMANDA) Screen, Reflex Specific Antibody; Future  - Rheumatoid Arthritis Factor [E]; Future  - Rheumatology Referral - In Network       The patient indicates understanding of these issues and agrees to the plan.  The patient is asked to return in 6 monts.     The above note was creating using Dragon speech recognition technology. Please excuse any typos.

## 2025-02-21 ENCOUNTER — LAB ENCOUNTER (OUTPATIENT)
Dept: LAB | Age: 35
End: 2025-02-21
Attending: NURSE PRACTITIONER
Payer: COMMERCIAL

## 2025-02-21 DIAGNOSIS — M25.50 MULTIPLE JOINT PAIN: ICD-10-CM

## 2025-02-21 LAB
ALBUMIN SERPL-MCNC: 4.4 G/DL (ref 3.2–4.8)
ALBUMIN/GLOB SERPL: 1.9 {RATIO} (ref 1–2)
ALP LIVER SERPL-CCNC: 51 U/L
ALT SERPL-CCNC: 13 U/L
ANION GAP SERPL CALC-SCNC: 5 MMOL/L (ref 0–18)
AST SERPL-CCNC: 14 U/L (ref ?–34)
BASOPHILS # BLD AUTO: 0.07 X10(3) UL (ref 0–0.2)
BASOPHILS NFR BLD AUTO: 1.1 %
BILIRUB SERPL-MCNC: 0.3 MG/DL (ref 0.3–1.2)
BUN BLD-MCNC: 15 MG/DL (ref 9–23)
BUN/CREAT SERPL: 18.8 (ref 10–20)
CALCIUM BLD-MCNC: 9.1 MG/DL (ref 8.7–10.4)
CHLORIDE SERPL-SCNC: 109 MMOL/L (ref 98–112)
CHOLEST SERPL-MCNC: 182 MG/DL (ref ?–200)
CO2 SERPL-SCNC: 28 MMOL/L (ref 21–32)
CREAT BLD-MCNC: 0.8 MG/DL
CREAT UR-SCNC: 167.2 MG/DL
CRP SERPL-MCNC: <0.4 MG/DL (ref ?–1)
DEPRECATED RDW RBC AUTO: 40.8 FL (ref 35.1–46.3)
EGFRCR SERPLBLD CKD-EPI 2021: 118 ML/MIN/1.73M2 (ref 60–?)
EOSINOPHIL # BLD AUTO: 0.25 X10(3) UL (ref 0–0.7)
EOSINOPHIL NFR BLD AUTO: 3.9 %
ERYTHROCYTE [DISTWIDTH] IN BLOOD BY AUTOMATED COUNT: 11.9 % (ref 11–15)
ERYTHROCYTE [SEDIMENTATION RATE] IN BLOOD: 2 MM/HR
FASTING PATIENT LIPID ANSWER: YES
FASTING STATUS PATIENT QL REPORTED: YES
GLOBULIN PLAS-MCNC: 2.3 G/DL (ref 2–3.5)
GLUCOSE BLD-MCNC: 183 MG/DL (ref 70–99)
HCT VFR BLD AUTO: 42 %
HDLC SERPL-MCNC: 35 MG/DL (ref 40–59)
HGB BLD-MCNC: 14.5 G/DL
IMM GRANULOCYTES # BLD AUTO: 0.01 X10(3) UL (ref 0–1)
IMM GRANULOCYTES NFR BLD: 0.2 %
LDLC SERPL CALC-MCNC: 127 MG/DL (ref ?–100)
LYMPHOCYTES # BLD AUTO: 2.66 X10(3) UL (ref 1–4)
LYMPHOCYTES NFR BLD AUTO: 41.1 %
MCH RBC QN AUTO: 32.1 PG (ref 26–34)
MCHC RBC AUTO-ENTMCNC: 34.5 G/DL (ref 31–37)
MCV RBC AUTO: 92.9 FL
MICROALBUMIN UR-MCNC: <0.3 MG/DL
MONOCYTES # BLD AUTO: 0.63 X10(3) UL (ref 0.1–1)
MONOCYTES NFR BLD AUTO: 9.7 %
NEUTROPHILS # BLD AUTO: 2.85 X10 (3) UL (ref 1.5–7.7)
NEUTROPHILS # BLD AUTO: 2.85 X10(3) UL (ref 1.5–7.7)
NEUTROPHILS NFR BLD AUTO: 44 %
NONHDLC SERPL-MCNC: 147 MG/DL (ref ?–130)
OSMOLALITY SERPL CALC.SUM OF ELEC: 300 MOSM/KG (ref 275–295)
PLATELET # BLD AUTO: 333 10(3)UL (ref 150–450)
POTASSIUM SERPL-SCNC: 4.5 MMOL/L (ref 3.5–5.1)
PROT SERPL-MCNC: 6.7 G/DL (ref 5.7–8.2)
RBC # BLD AUTO: 4.52 X10(6)UL
RHEUMATOID FACT SERPL-ACNC: <3.5 IU/ML (ref ?–14)
SODIUM SERPL-SCNC: 142 MMOL/L (ref 136–145)
TRIGL SERPL-MCNC: 110 MG/DL (ref 30–149)
TSI SER-ACNC: 0.7 UIU/ML (ref 0.55–4.78)
VLDLC SERPL CALC-MCNC: 20 MG/DL (ref 0–30)
WBC # BLD AUTO: 6.5 X10(3) UL (ref 4–11)

## 2025-02-21 PROCEDURE — 85652 RBC SED RATE AUTOMATED: CPT

## 2025-02-21 PROCEDURE — 86431 RHEUMATOID FACTOR QUANT: CPT

## 2025-02-21 PROCEDURE — 86140 C-REACTIVE PROTEIN: CPT

## 2025-02-21 PROCEDURE — 80053 COMPREHEN METABOLIC PANEL: CPT | Performed by: NURSE PRACTITIONER

## 2025-02-21 PROCEDURE — 86038 ANTINUCLEAR ANTIBODIES: CPT

## 2025-02-21 PROCEDURE — 82043 UR ALBUMIN QUANTITATIVE: CPT | Performed by: NURSE PRACTITIONER

## 2025-02-21 PROCEDURE — 86225 DNA ANTIBODY NATIVE: CPT

## 2025-02-21 PROCEDURE — 84443 ASSAY THYROID STIM HORMONE: CPT | Performed by: NURSE PRACTITIONER

## 2025-02-21 PROCEDURE — 85025 COMPLETE CBC W/AUTO DIFF WBC: CPT | Performed by: NURSE PRACTITIONER

## 2025-02-21 PROCEDURE — 80061 LIPID PANEL: CPT | Performed by: NURSE PRACTITIONER

## 2025-02-21 PROCEDURE — 36415 COLL VENOUS BLD VENIPUNCTURE: CPT | Performed by: NURSE PRACTITIONER

## 2025-02-21 PROCEDURE — 82570 ASSAY OF URINE CREATININE: CPT | Performed by: NURSE PRACTITIONER

## 2025-02-24 LAB
DSDNA IGG SERPL IA-ACNC: 0.7 IU/ML
ENA AB SER QL IA: <0.09 UG/L
ENA AB SER QL IA: NEGATIVE

## 2025-03-19 DIAGNOSIS — S22.068S OTHER CLOSED FRACTURE OF EIGHTH THORACIC VERTEBRA, SEQUELA: ICD-10-CM

## 2025-03-19 DIAGNOSIS — M81.8 OTHER OSTEOPOROSIS WITHOUT CURRENT PATHOLOGICAL FRACTURE: ICD-10-CM

## 2025-03-19 DIAGNOSIS — I10 ESSENTIAL HYPERTENSION: ICD-10-CM

## 2025-03-19 DIAGNOSIS — E83.52 HYPERCALCEMIA: ICD-10-CM

## 2025-03-19 DIAGNOSIS — E10.649 UNCONTROLLED TYPE 1 DIABETES MELLITUS WITH HYPOGLYCEMIA WITHOUT COMA (HCC): ICD-10-CM

## 2025-03-19 DIAGNOSIS — E10.65 UNCONTROLLED TYPE 1 DIABETES MELLITUS WITH HYPERGLYCEMIA (HCC): ICD-10-CM

## 2025-03-19 RX ORDER — INSULIN LISPRO 100 [IU]/ML
INJECTION, SOLUTION INTRAVENOUS; SUBCUTANEOUS
Qty: 90 ML | Refills: 0 | Status: SHIPPED | OUTPATIENT
Start: 2025-03-19

## 2025-03-19 NOTE — TELEPHONE ENCOUNTER
Endocrine refill protocol for rapid acting, regular, intermediate, and mixed insulin:    Protocol Criteria:  PASSED Reason: N/A    If all below requirements are met, send a 90-day supply with 1 refill per provider protocol.    Verify appointment with Endocrinology completed in the last 6 months or scheduled in the next 3 months.  Verify A1C has been completed within the last 6 months and is below 8.5%    -May substitute prescriptions for Novolog and Humalog unless documented allergy (pens and vials) at the same dose and concentration per insurance preference and provider protocol.   -May substitute prescriptions for Novolin R and Humulin R unless documented allergy (pens and vials) at the same dose and concentration per insurance preference and provider protocol.   -May substitute prescriptions for Novolin N and Humulin N unless documented allergy (pens and vials) at the same dose and concentration per insurance preference and provider protocol.   -May substitute prescriptions for Humulin and Novolin 70/30 insulin unless documented allergy at the same dose and concentration per insurance preference and provider protocol.    Last completed office visit: 6/27/2024 Juan Pablo Palmer MD   Next scheduled Follow up:   Future Appointments   Date Time Provider Department Center   3/27/2025 10:45 AM Juan Pablo Palmer MD [facility]    4/2/2025  9:00 AM Elva Julian MD [facility]      Last A1C result: 7.3% done 5/3/2024.

## 2025-04-16 ENCOUNTER — HOSPITAL ENCOUNTER (OUTPATIENT)
Age: 35
Discharge: HOME | End: 2025-04-16
Payer: COMMERCIAL

## 2025-04-16 VITALS
TEMPERATURE: 99 F | HEART RATE: 120 BPM | SYSTOLIC BLOOD PRESSURE: 150 MMHG | RESPIRATION RATE: 16 BRPM | DIASTOLIC BLOOD PRESSURE: 92 MMHG | OXYGEN SATURATION: 96 %

## 2025-04-16 DIAGNOSIS — J10.1 INFLUENZA B: Primary | ICD-10-CM

## 2025-04-16 DIAGNOSIS — J01.90 ACUTE NON-RECURRENT SINUSITIS, UNSPECIFIED LOCATION: ICD-10-CM

## 2025-04-16 LAB
POCT INFLUENZA A: NEGATIVE
POCT INFLUENZA B: POSITIVE
SARS-COV-2 RNA RESP QL NAA+PROBE: NOT DETECTED

## 2025-04-16 PROCEDURE — 87502 INFLUENZA DNA AMP PROBE: CPT | Performed by: NURSE PRACTITIONER

## 2025-04-16 PROCEDURE — 99213 OFFICE O/P EST LOW 20 MIN: CPT

## 2025-04-16 PROCEDURE — 99214 OFFICE O/P EST MOD 30 MIN: CPT

## 2025-04-16 RX ORDER — BENZONATATE 100 MG/1
100 CAPSULE ORAL 3 TIMES DAILY PRN
Qty: 30 CAPSULE | Refills: 0 | Status: SHIPPED | OUTPATIENT
Start: 2025-04-16 | End: 2025-04-26

## 2025-04-16 NOTE — ED PROVIDER NOTES
Patient Seen in: [facility] [place]      History     Chief Complaint   Patient presents with    Cough     Sore throat, persistent cough for 3 days, exhaustion, wheezing, COVID negative on Monday. - Entered by patient     Stated Complaint: Cough - Sore throat, persistent cough for 3 days, exhaustion, wheezing, COVID n*    Subjective: This is a 34-year-old male, past medical history of type 1 diabetes, osteoporosis, presents to immediate care clinic for evaluation of: Viral symptoms that been ongoing for about 5 days.  Patient states he began to feel sick on Saturday.  Positive headache, chills, body aches, sore throat, cough, congestion.  He denies abdominal pain, nausea, vomiting, diarrhea.  No documented fevers.  Patient notes his daughter was sick 1 to 2 weeks ago with similar symptoms.  He states his wife was in the hospital for about 2 days last week and he was at her bedside.  No recent travel.  Patient has been taking over-the-counter cold medication.  He states his blood sugars have been well-controlled.  He was recently diagnosed with rheumatoid arthritis, he is due to see his rheumatologist tomorrow.  He is otherwise well-appearing.  AO x 4.  The history is provided by the patient.         History of Present Illness               Objective:     Past Medical History:    Osteoporosis    Type 1 diabetes mellitus (HCC)              History reviewed. No pertinent surgical history.             No pertinent social history.            Review of Systems   Constitutional:  Positive for activity change, appetite change, chills and fatigue. Negative for fever.   HENT:  Positive for congestion, ear pain, postnasal drip, rhinorrhea, sinus pressure, sinus pain, sneezing and sore throat. Negative for dental problem, drooling, ear discharge, facial swelling, hearing loss, mouth sores and nosebleeds.    Respiratory:  Positive for cough and wheezing. Negative for apnea, choking, chest tightness, shortness of breath and  stridor.    Cardiovascular: Negative.    Gastrointestinal: Negative.    Musculoskeletal: Negative.    Skin: Negative.    Neurological:  Positive for headaches. Negative for dizziness, weakness and light-headedness.       Positive for stated complaint: Cough - Sore throat, persistent cough for 3 days, exhaustion, wheezing, COVID n*  Other systems are as noted in HPI.  Constitutional and vital signs reviewed.      All other systems reviewed and negative except as noted above.                  Physical Exam     ED Triage Vitals [04/16/25 1551]   BP (!) 150/92   Pulse 120   Resp 16   Temp 98.7 °F (37.1 °C)   Temp src Oral   SpO2 96 %   O2 Device None (Room air)       Current Vitals:   Vital Signs  BP: (!) 150/92  Pulse: 120  Resp: 16  Temp: 98.7 °F (37.1 °C)  Temp src: Oral    Oxygen Therapy  SpO2: 96 %  O2 Device: None (Room air)        Physical Exam  Constitutional:       General: He is not in acute distress.     Appearance: Normal appearance. He is not ill-appearing or toxic-appearing.   HENT:      Head: Normocephalic.      Jaw: There is normal jaw occlusion.      Right Ear: Tympanic membrane, ear canal and external ear normal.      Left Ear: Tympanic membrane, ear canal and external ear normal.      Nose: Congestion present.      Right Sinus: Maxillary sinus tenderness present. No frontal sinus tenderness.      Left Sinus: No maxillary sinus tenderness or frontal sinus tenderness.      Mouth/Throat:      Lips: Pink. No lesions.      Mouth: Mucous membranes are moist. No oral lesions.      Dentition: No gum lesions.      Tongue: No lesions.      Palate: No lesions.      Pharynx: Uvula midline. Postnasal drip present. No pharyngeal swelling, oropharyngeal exudate, posterior oropharyngeal erythema or uvula swelling.   Eyes:      Extraocular Movements: Extraocular movements intact.      Pupils: Pupils are equal, round, and reactive to light.   Cardiovascular:      Rate and Rhythm: Normal rate and regular rhythm.       Pulses: Normal pulses.      Heart sounds: Normal heart sounds.   Pulmonary:      Effort: Pulmonary effort is normal. No respiratory distress.      Breath sounds: Normal breath sounds. No stridor. No wheezing, rhonchi or rales.   Chest:      Chest wall: No tenderness.   Musculoskeletal:         General: Normal range of motion.      Cervical back: Normal range of motion.   Lymphadenopathy:      Cervical: No cervical adenopathy.   Skin:     General: Skin is warm.      Capillary Refill: Capillary refill takes less than 2 seconds.   Neurological:      General: No focal deficit present.      Mental Status: He is alert and oriented to person, place, and time.           Physical Exam                ED Course     Labs Reviewed   POCT FLU TEST - Abnormal; Notable for the following components:       Result Value    POCT INFLUENZA B Positive (*)     All other components within normal limits    Narrative:     This assay is a rapid molecular in vitro test utilizing nucleic acid amplification of influenza A and B viral RNA.   RAPID SARS-COV-2 BY PCR - Normal          Results                                 MDM      Differentials considered include: COVID-19, influenza A, influenza B, viral URI, pneumonia, acute sinusitis, AOM    Patient negative for COVID-19.  Lungs are clear on exam, no wheezing, rhonchi, rales, coarse or diminished breath sounds.  No evidence of pneumonia or bronchitis.    Bilateral TMs visualized with good landmarks and light reflex.  No erythema, bulging, perforation, retraction.  No evidence of AOM.  No evidence of middle ear effusion.    Patient does have some fullness and tenderness of the right maxillary sinus.  He does note right ear pain that has been intermittent.  Positive postnasal drip to posterior pharynx without any evidence of strep pharyngitis or peritonsillar abscess.    Patient is positive for influenza B, however, he is outside of window of eligibility to receive Tamiflu.  Did discuss with  patient likely sinus infection.  He is on day 5 of symptoms.    Will prescribe cough suppressant and Augmentin.  Patient states he is going to ask his rheumatologist tomorrow at his appointment since he has a new diagnosis of RA and he believes they are going to start him on methotrexate.  I do think this is a reasonable idea.    Educated patient on additional supportive and symptomatic management at home for his symptoms.  He verbalized understanding agrees with plan of care        Medical Decision Making      Disposition and Plan     Clinical Impression:  1. Influenza B    2. Acute non-recurrent sinusitis, unspecified location         Disposition:  Discharge  4/16/2025  4:18 pm    Follow-up:  Maureen Guerra MD  [address] [place] [address]  [phone]    In 2 weeks  If symptoms worsen          Medications Prescribed:  Discharge Medication List as of 4/16/2025  4:18 PM        START taking these medications    Details   amoxicillin clavulanate 875-125 MG Oral Tab Take 1 tablet by mouth 2 (two) times daily for 7 days., Normal, Disp-14 tablet, R-0      benzonatate 100 MG Oral Cap Take 1 capsule (100 mg total) by mouth 3 (three) times daily as needed for cough., Normal, Disp-30 capsule, R-0             Supplementary Documentation:

## 2025-04-16 NOTE — DISCHARGE INSTRUCTIONS
As discussed, you are negative for COVID-19.  You are positive for influenza B, however, because you have been sick for the past 5 days, you are outside of the window of eligibility to receive a medication called Tamiflu.  This is sometimes prescribed to help decrease the length and severity of flu symptoms, however, it needs to be initiated within the first 48 hours in order for it to be effective.    I do believe you have a sinus infection.  For this I have sent antibiotics to the pharmacy, twice a day for 7 days.  Take antibiotics with food and water.  Drink plenty of water and electrolytes.  Sleep somewhat elevated upright.  Sleep with humidifier.  Steam showers for cough and congestion.  Cough suppressant prescribed, he may take this medication up to 3 times a day.    Please make sure you are being diligent about monitoring your blood sugar.      Take Tylenol and Motrin as needed for any fevers, aches, pains.    Follow-up with your primary care doctor in 2 weeks if no improvement of symptoms.

## 2025-05-08 RX ORDER — ACYCLOVIR 400 MG/1
TABLET ORAL
Qty: 9 EACH | Refills: 1 | Status: SHIPPED | OUTPATIENT
Start: 2025-05-08

## 2025-05-08 NOTE — TELEPHONE ENCOUNTER
Endocrine Refill protocol for CGM supplies     Protocol Criteria:  PASSED Reason: N/A    If below requirement is met, send a 90-day supply with 1 refill per provider protocol.     Verify appointment with Endocrinology completed in the last 12 months or scheduled in the next 6 months     Last completed office visit:6/27/2024 Juan Pablo Palmer MD     Next scheduled Follow up: No future appointments. Mcm sent

## 2025-06-19 RX ORDER — ACYCLOVIR 400 MG/1
TABLET ORAL
Qty: 9 EACH | Refills: 0 | Status: SHIPPED | OUTPATIENT
Start: 2025-06-19

## 2025-06-19 NOTE — TELEPHONE ENCOUNTER
Endocrine Refill protocol for CGM supplies     Protocol Criteria:  PASSED Reason: N/A    If below requirement is met, send a 90-day supply with 1 refill per provider protocol.     Verify appointment with Endocrinology completed in the last 12 months or scheduled in the next 6 months     Last completed office visit:6/27/2024 Juan Pablo Palmer MD   Last completed telemed visit: Visit date not found  Next scheduled Follow up:   Future Appointments   Date Time Provider Department Center   7/24/2025 12:00 PM [facility]

## 2025-07-24 ENCOUNTER — OFFICE VISIT (OUTPATIENT)
Facility: LOCATION | Age: 35
End: 2025-07-24

## 2025-07-24 VITALS
BODY MASS INDEX: 25.9 KG/M2 | DIASTOLIC BLOOD PRESSURE: 82 MMHG | HEART RATE: 120 BPM | SYSTOLIC BLOOD PRESSURE: 110 MMHG | WEIGHT: 165 LBS | HEIGHT: 67 IN

## 2025-07-24 DIAGNOSIS — R73.09 HIGH GLUCOSE LEVEL: ICD-10-CM

## 2025-07-24 DIAGNOSIS — D71 GRANULOMATOUS DISEASE (HCC): ICD-10-CM

## 2025-07-24 DIAGNOSIS — E10.65 UNCONTROLLED TYPE 1 DIABETES MELLITUS WITH HYPERGLYCEMIA (HCC): Primary | ICD-10-CM

## 2025-07-24 DIAGNOSIS — E10.649 UNCONTROLLED TYPE 1 DIABETES MELLITUS WITH HYPOGLYCEMIA WITHOUT COMA (HCC): ICD-10-CM

## 2025-07-24 DIAGNOSIS — M06.9 RHEUMATOID ARTHRITIS, INVOLVING UNSPECIFIED SITE, UNSPECIFIED WHETHER RHEUMATOID FACTOR PRESENT (HCC): ICD-10-CM

## 2025-07-24 PROCEDURE — 95251 CONT GLUC MNTR ANALYSIS I&R: CPT | Performed by: INTERNAL MEDICINE

## 2025-07-24 PROCEDURE — 99214 OFFICE O/P EST MOD 30 MIN: CPT | Performed by: INTERNAL MEDICINE

## 2025-07-24 NOTE — PROGRESS NOTES
Reason for Visit:  t1DM.    Requesting Physician:   ..Maureen Guerra MD    CHIEF COMPLAINT:  t1DM on pump     HISTORY OF PRESENT ILLNESS:   Vasiliy Adams is a 34 year old uncontrolled t1DM on pump, hypo and hyperglycemia, osteoporosis with T8 fracture, RA and a smoker      Was dx with RA . Was on Methotrexate and needs a new referral now.   BG is better but still getting low after taking late bolus.   Eating dinner late now   Eating on the road more now and traveling more      10/2023 T8 fracture from trampoline   DXA was done 2024 showed osteoporosis   Saw spine surgeon    Labs showed normal-high Ca, high calcium in the urine. PTH is low so it is not PTH mediated. We need to look for other etiology for high serum and urine calcium   Cortisol and thyroid levels are normal   Vit D is normal   SPEP normal   CT showed Calcified mediastinal lymph nodes suggestive of remote granulomatous disease.   Otherwise, no primary malignancy or metastatic disease seen within the chest, abdomen or pelvis  He had a  pulm  consult    t1DM dx when 12   DKA x2. Last in   Has been on pump since . Omnipod, [email or web address] and now on pump 770g ~ 2018               Bolus   ICR 6.9          insulin carb ratio   ISF 40             insulin sensitivity factor   AIT 5            active insulin time   Target 110    Basal 36.8 unit/day  [place] 0.9  3AM  1.4  7AM 1.8  330PM 1.55       2025  , tsh, lipid, CMP and Neg alb/creat      SSx   Works for [employer],   Travels for work      1 yo daughter   Vaping   No etoh now - 8 mo stopped   No marijuana   No drugs       T8 Vertebrae fracture - saw spine specialist for that. 2024   Takes vit D and Ca daily now         DM quality measures:  A1C/Blood pressure: as reported above   Nephropathy screenin2025   Last dilated eye exam: 2023  data recorded  Last diabetic foot exam: 2025     Dentist : recommend every 6m     Micro Albumen/Creatinine:    Lab Results   Component Value Date     MICROALBCREA  02/21/2025      Comment:      Unable to calculate due to Urine Microalbumin <0.3 mg/dL        MICROALBCREA  11/20/2023      Comment:      Unable to calculate due to Urine Microalbumin <0.3 mg/dL             ASSESSMENT AND PLAN:  34 year old uncontrolled t1DM on pump, hypo and hyperglycemia, osteoporosis with T8 fracture non-PTH mediated hypercalcemia, RA and a smoker   D/w pt his CGM/pump data   UTD on foot exam 7/24/2025   He needs eye exam now   Getting low BG after correcting/bolusing. We discussed today how to avoid        Last A1c value was 7.3% done 5/3/2024.    Plan  Will refer to rheum and ophtha  Test ICR and ISF   RTC in 3 mo   Bolus on time     Take vitamin D3 2000 international unit a day and calcium 600 mg twice a day or 3 servings of dairy a day   Do weight bearing exercise   Follow fall precautions     We adjusted pump settings   Bolus   ICR 6.9            insulin carb ratio   ISF 35-40       insulin sensitivity factor   AIT 5         active insulin time   Target 110               PAST MEDICAL HISTORY:   T1dm  T8 Vertebrae fracture - saw spine specialist for that. 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      CALCIUM OR Take by mouth. 1,000 mg in the morning and 1,000 mg in the afternoon      Multiple Vitamin (MULTIVITAMIN OR) Take 1 tablet by mouth daily.      Naproxen Sodium (ALEVE OR) Take by mouth.      amphetamine-dextroamphetamine 10 MG Oral Tab Take 1  tablet (10 mg total) by mouth as needed.      sertraline 100 MG Oral Tab Take 1 tablet (100 mg total) by mouth daily.      insulin glargine (LANTUS SOLOSTAR) 100 UNIT/ML Subcutaneous Solution Pen-injector Inject 40 Units into the skin nightly. 36 mL 0    Urine Glucose-Ketones Test In Vitro Strip Use if BG > 250 50 strip 0    Glucose Blood (BLOOD GLUCOSE TEST STRIPS 333) In Vitro Strip 1 each by In Vitro route 3 (three) times daily. 100 strip 11    Continuous Blood Gluc Sensor (DEXCOM G6 SENSOR) Does not apply Misc 1 each Every 10 days. Use as directed every 10 days 3 each 11       ALLERGIES:  No Known Allergies    SOCIAL HISTORY:    Social History     Socioeconomic History    Marital status:    Tobacco Use    Smoking status: Former     Current packs/day: 0.00     Types: Cigarettes     Quit date:      Years since quittin.5     Passive exposure: Past    Smokeless tobacco: Former     Quit date:    Vaping Use    Vaping status: Some Days    Substances: Nicotine    Devices: Disposable   Substance and Sexual Activity    Alcohol use: Not Currently    Drug use: Not Currently       FAMILY HISTORY:     T1dm in PGF  Cousin with t1DM had HD and passed a way from complications   F prostate ca in remission now  P uncle cancer ?  MGF lung ca  M osteoporosis, recovery from etoh, adult ADD  Brother is in recovery from etoh         PHYSICAL EXAM:   Height: 5' 7\" (170.2 cm) ( 1200)  Weight: 165 lb (74.8 kg) ( 1200)  BSA (Calculated - sq m): 1.86 sq meters ( 1200)  Pulse: 120 ( 1200)  BP: 110/82 ( 1248)  Temp: --  Do Not Use - Resp Rate: --  SpO2: --          DATA:     Pertinent data reviewed   Latest Reference Range & Units 25 08:40   Glucose 70 - 99 mg/dL 183 (H)   Sodium 136 - 145 mmol/L 142   Potassium 3.5 - 5.1 mmol/L 4.5   Chloride 98 - 112 mmol/L 109   Carbon Dioxide, Total 21.0 - 32.0 mmol/L 28.0   BUN 9 - 23 mg/dL 15   CREATININE 0.70 - 1.30 mg/dL 0.80   CALCIUM 8.7 - 10.4 mg/dL  9.1   BUN/CREATININE RATIO 10.0 - 20.0  18.8   EGFR >=60 mL/min/1.73m2 118   ANION GAP 0 - 18 mmol/L 5   CALCULATED OSMOLALITY 275 - 295 mOsm/kg 300 (H)   ALKALINE PHOSPHATASE 45 - 117 U/L 51   AST (SGOT) <34 U/L 14   ALT (SGPT) 10 - 49 U/L 13   Total Bilirubin 0.3 - 1.2 mg/dL 0.3   Globulin 2.0 - 3.5 g/dL 2.3   Cholesterol, Total <200 mg/dL 182   Triglycerides 30 - 149 mg/dL 110   HDL Cholesterol 40 - 59 mg/dL 35 (L)   VLDL 0 - 30 mg/dL 20   NON-HDL CHOLESTEROL <130 mg/dL 147 (H)   LDL Cholesterol Calc <100 mg/dL 127 (H)   A/G Ratio 1.0 - 2.0  1.9   PROTEIN, TOTAL 5.7 - 8.2 g/dL 6.7   Albumin 3.2 - 4.8 g/dL 4.4   C-REACTIVE PROTEIN <1.00 mg/dL <0.40   Patient Fasting for CMP?  Yes   Patient Fasting for Lipid?  Yes   TSH 0.550 - 4.780 uIU/mL 0.699   (H): Data is abnormally high  (L): Data is abnormally low  No orders of the defined types were placed in this encounter.     05/28/24 11:36   CT CHEST+ABDOMEN PELVIS(ALL CNTRST  ) Calcified mediastinal lymph nodes suggestive of remote granulomatous disease.      Otherwise, no primary malignancy or metastatic disease seen within the chest, abdomen or pelvis    Rpt: View report in Results Review for more information   DXA 5/2024   Findings in the left femoral neck and total left hip suggest osteopenia, there may be increased fracture risk.  The 10 year fracture risk for major osteoporotic fracture is 8.3%, and for hip fracture is 4.6%.   2. Findings in the total AP lumbar spine suggest osteoporosis, and there is increased fracture risk        No results for input(s): \"TSH\", \"T4F\", \"T3F\", \"THYP\" in the last 72 hours.  No results found.    No orders of the defined types were placed in this encounter.    No orders of the defined types were placed in this encounter.         This is a specialized patient consultation in endocrinology and required comprehensive review of prior records, as well as current evaluation, with time required for consideration of complex endocrine  issues and consultation. For this visit, I personally interviewed the patient, and family member if accompanied, performed the pertinent parts of the history and physical examination. ROS included screening for appropriate endocrine conditions.   Today's diagnosis and plan were reviewed in detail with the patient who states understanding and agrees with plan. I discussed with the patient possible diagnosis, differential diagnosis, need for work up, treatment options, alternatives and side effects.     Please see note for details about time spent which includes:   · pre-visit preparation  · reviewing records  · face to face time with the patient   · timely documentation of the encounter  · ordering medications/tests  · communication with care team  · care coordination    I appreciate the opportunity to be part of your patient's medical care and will keep you, as the referring and primary physicians, informed about the care of your patient. Please feel free to contact me should you have any questions.    The 21st Century Cures Act makes medical notes like these available to patients in the interest of transparency. Please be advised this is a medical document. Medical documents are intended to carry relevant information, facts as evident, and the clinical opinion of the practitioner. The medical note is intended as peer to peer communication and may appear blunt or direct. It is written in medical language and may contain abbreviations or verbiage that are unfamiliar.   Juan Pablo Palmer MD

## (undated) NOTE — LETTER
1/4/2024        Vasiliy Adams        [address]        [place] [address]     Case number: [id number]  Plan member ID: [id number]       To Whom It May Concern,      I am appealing the denial of the Dexcom G7 sensor for Vasiliy Adams. Vasiliy has type 1 diabetes (E10.65) and has used an insulin pump and continuous glucose monitors for the past 5 years. He is motivated and knowledgeable about the use of continuous glucose monitoring, is adherent to the diabetic treatment plan, and is participating in ongoing education and support. His A1C was most recently high at 9.6%, so he has inadequate glycemic control despite intensive diabetes management. He checks his blood sugar 4 times daily regularly. He is transitioning to the Tandem pump which is compatible with only the Dexcom continuous glucose monitor system. He needs the Dexcom G7 sensors in order to adequately control his diabetes. Please contact my office with further questions.         Sincerely,    Juan Pablo Palmer MD  [facility], [facility], [place]  [address], [address]  [facility] [id number] [phone]